# Patient Record
Sex: FEMALE | Race: WHITE | Employment: FULL TIME | URBAN - METROPOLITAN AREA
[De-identification: names, ages, dates, MRNs, and addresses within clinical notes are randomized per-mention and may not be internally consistent; named-entity substitution may affect disease eponyms.]

---

## 2017-06-26 ENCOUNTER — OFFICE VISIT (OUTPATIENT)
Dept: FAMILY MEDICINE CLINIC | Age: 27
End: 2017-06-26

## 2017-06-26 VITALS
BODY MASS INDEX: 21.19 KG/M2 | SYSTOLIC BLOOD PRESSURE: 112 MMHG | OXYGEN SATURATION: 98 % | RESPIRATION RATE: 14 BRPM | TEMPERATURE: 98.8 F | WEIGHT: 119.6 LBS | DIASTOLIC BLOOD PRESSURE: 86 MMHG | HEART RATE: 108 BPM | HEIGHT: 63 IN

## 2017-06-26 DIAGNOSIS — J30.2 SEASONAL ALLERGIC RHINITIS, UNSPECIFIED ALLERGIC RHINITIS TRIGGER: ICD-10-CM

## 2017-06-26 DIAGNOSIS — Z51.81 MEDICATION MONITORING ENCOUNTER: ICD-10-CM

## 2017-06-26 DIAGNOSIS — R73.01 IFG (IMPAIRED FASTING GLUCOSE): ICD-10-CM

## 2017-06-26 DIAGNOSIS — F41.1 GAD (GENERALIZED ANXIETY DISORDER): ICD-10-CM

## 2017-06-26 DIAGNOSIS — F33.42 RECURRENT MAJOR DEPRESSIVE DISORDER, IN FULL REMISSION (HCC): ICD-10-CM

## 2017-06-26 DIAGNOSIS — E04.1 THYROID NODULE: ICD-10-CM

## 2017-06-26 DIAGNOSIS — F41.0 PANIC ATTACK: Primary | ICD-10-CM

## 2017-06-26 DIAGNOSIS — G47.26 SHIFT WORK SLEEP DISORDER: ICD-10-CM

## 2017-06-26 PROCEDURE — 99203 OFFICE O/P NEW LOW 30 MIN: CPT | Performed by: NURSE PRACTITIONER

## 2017-06-26 RX ORDER — FLUTICASONE PROPIONATE 50 MCG
2 SPRAY, SUSPENSION (ML) NASAL DAILY PRN
Qty: 1 BOTTLE | Refills: 11 | Status: SHIPPED | OUTPATIENT
Start: 2017-06-26 | End: 2017-08-11 | Stop reason: SDUPTHER

## 2017-06-26 RX ORDER — OLOPATADINE HYDROCHLORIDE 1 MG/ML
1 SOLUTION/ DROPS OPHTHALMIC 2 TIMES DAILY PRN
Qty: 5 ML | Refills: 11 | Status: SHIPPED | OUTPATIENT
Start: 2017-06-26 | End: 2017-12-19 | Stop reason: SDUPTHER

## 2017-06-26 RX ORDER — CLONAZEPAM 1 MG/1
1.5 TABLET ORAL 2 TIMES DAILY
COMMUNITY
End: 2017-06-26 | Stop reason: SDUPTHER

## 2017-06-26 RX ORDER — ZOLPIDEM TARTRATE 10 MG/1
10 TABLET ORAL NIGHTLY PRN
Qty: 30 TABLET | Refills: 1 | Status: SHIPPED | OUTPATIENT
Start: 2017-06-26 | End: 2017-10-27 | Stop reason: SDUPTHER

## 2017-06-26 RX ORDER — MONTELUKAST SODIUM 10 MG/1
10 TABLET ORAL NIGHTLY
Qty: 30 TABLET | Refills: 11 | Status: SHIPPED | OUTPATIENT
Start: 2017-06-26 | End: 2017-12-19 | Stop reason: SDUPTHER

## 2017-06-26 RX ORDER — CLONAZEPAM 1 MG/1
1 TABLET ORAL 2 TIMES DAILY
Qty: 60 TABLET | Status: CANCELLED | OUTPATIENT
Start: 2017-06-26

## 2017-06-26 RX ORDER — DESVENLAFAXINE 50 MG/1
50 TABLET, EXTENDED RELEASE ORAL DAILY
Qty: 30 TABLET | Refills: 5 | Status: SHIPPED | OUTPATIENT
Start: 2017-06-26 | End: 2017-11-22 | Stop reason: SDUPTHER

## 2017-06-26 RX ORDER — LORAZEPAM 1 MG/1
1 TABLET ORAL EVERY 8 HOURS PRN
Qty: 30 TABLET | Refills: 0 | Status: SHIPPED | OUTPATIENT
Start: 2017-06-26 | End: 2017-10-12 | Stop reason: SDUPTHER

## 2017-06-26 RX ORDER — CLONAZEPAM 1 MG/1
1.5 TABLET ORAL 2 TIMES DAILY
Qty: 90 TABLET | Refills: 1 | Status: SHIPPED | OUTPATIENT
Start: 2017-06-26 | End: 2017-11-22 | Stop reason: ALTCHOICE

## 2017-06-26 ASSESSMENT — PATIENT HEALTH QUESTIONNAIRE - PHQ9
SUM OF ALL RESPONSES TO PHQ QUESTIONS 1-9: 0
1. LITTLE INTEREST OR PLEASURE IN DOING THINGS: 0
2. FEELING DOWN, DEPRESSED OR HOPELESS: 0
SUM OF ALL RESPONSES TO PHQ9 QUESTIONS 1 & 2: 0

## 2017-06-26 ASSESSMENT — ENCOUNTER SYMPTOMS
COUGH: 0
SHORTNESS OF BREATH: 0
ABDOMINAL PAIN: 0
NAUSEA: 0

## 2017-08-11 DIAGNOSIS — J30.2 SEASONAL ALLERGIC RHINITIS, UNSPECIFIED ALLERGIC RHINITIS TRIGGER: ICD-10-CM

## 2017-08-14 RX ORDER — FLUTICASONE PROPIONATE 50 MCG
SPRAY, SUSPENSION (ML) NASAL
Qty: 16 G | Refills: 11 | Status: SHIPPED | OUTPATIENT
Start: 2017-08-14 | End: 2019-01-04 | Stop reason: SDUPTHER

## 2017-10-09 DIAGNOSIS — F41.0 PANIC ATTACK: ICD-10-CM

## 2017-10-09 RX ORDER — LORAZEPAM 1 MG/1
1 TABLET ORAL EVERY 8 HOURS PRN
Qty: 30 TABLET | Refills: 0 | OUTPATIENT
Start: 2017-10-09

## 2017-10-09 NOTE — TELEPHONE ENCOUNTER
Cedric Roe called requesting a refill on the following medications:  Requested Prescriptions     Pending Prescriptions Disp Refills    LORazepam (ATIVAN) 1 MG tablet 30 tablet 0     Sig: Take 1 tablet by mouth every 8 hours as needed for Anxiety     Pharmacy verified:  [email or web address]      Date of last visit: 6/26/17  Date of next visit (if applicable): 59/78/0166        Date of last fill and quantity (to be completed by clinical staff)  Pharmacy name: [facility]

## 2017-10-10 ENCOUNTER — TELEPHONE (OUTPATIENT)
Dept: FAMILY MEDICINE CLINIC | Age: 27
End: 2017-10-10

## 2017-10-10 NOTE — TELEPHONE ENCOUNTER
Pt was notified and is not happy with this decision. Pt says she is uncomfortable seeing a Psychiatrist for multiple reasons and would like to discuss this with you in more detail.  Appt scheduled Thursday per pt request.

## 2017-10-10 NOTE — TELEPHONE ENCOUNTER
called pt to ask if she has appt with psychiatrist  Pt informed that she see a psychologist and her family dr gave her the Ativan  He is no longer in practice   She really doesn't want to see a psychiatrist

## 2017-10-12 ENCOUNTER — OFFICE VISIT (OUTPATIENT)
Dept: FAMILY MEDICINE CLINIC | Age: 27
End: 2017-10-12
Payer: COMMERCIAL

## 2017-10-12 VITALS
BODY MASS INDEX: 21.77 KG/M2 | RESPIRATION RATE: 20 BRPM | WEIGHT: 127.5 LBS | SYSTOLIC BLOOD PRESSURE: 124 MMHG | HEIGHT: 64 IN | DIASTOLIC BLOOD PRESSURE: 84 MMHG | HEART RATE: 104 BPM

## 2017-10-12 DIAGNOSIS — F41.1 GAD (GENERALIZED ANXIETY DISORDER): Primary | ICD-10-CM

## 2017-10-12 DIAGNOSIS — F41.0 PANIC ATTACK: ICD-10-CM

## 2017-10-12 DIAGNOSIS — F33.42 RECURRENT MAJOR DEPRESSIVE DISORDER, IN FULL REMISSION (HCC): ICD-10-CM

## 2017-10-12 PROCEDURE — 99213 OFFICE O/P EST LOW 20 MIN: CPT | Performed by: NURSE PRACTITIONER

## 2017-10-12 RX ORDER — BUSPIRONE HYDROCHLORIDE 10 MG/1
10 TABLET ORAL 2 TIMES DAILY
Qty: 60 TABLET | Refills: 2 | Status: SHIPPED | OUTPATIENT
Start: 2017-10-12 | End: 2018-05-22 | Stop reason: ALTCHOICE

## 2017-10-12 RX ORDER — LORAZEPAM 1 MG/1
1 TABLET ORAL EVERY 8 HOURS PRN
Qty: 30 TABLET | Refills: 0 | Status: SHIPPED | OUTPATIENT
Start: 2017-10-12 | End: 2017-11-22 | Stop reason: SDUPTHER

## 2017-10-12 ASSESSMENT — ENCOUNTER SYMPTOMS
NAUSEA: 0
SHORTNESS OF BREATH: 0
COUGH: 0
ABDOMINAL PAIN: 0

## 2017-10-12 NOTE — PROGRESS NOTES
Visit Information    Have you changed or started any medications since your last visit including any over-the-counter medicines, vitamins, or herbal medicines? no   Are you having any side effects from any of your medications? -  no  Have you stopped taking any of your medications? Is so, why? -  no    Have you seen any other physician or provider since your last visit? No  Have you had any other diagnostic tests since your last visit? No  Have you been seen in the emergency room and/or had an admission to a hospital since we last saw you? No  Have you had your routine dental cleaning in the past 6 months? no    Have you activated your [employer] account? If not, what are your barriers?  Yes     Patient Care Team:  Cony Mejia NP as PCP - General (Certified Nurse Practitioner)    Medical History Review  Past Medical, Family, and Social History reviewed and does not contribute to the patient presenting condition    Health Maintenance   Topic Date Due    HIV screen  04/12/2003    DTaP/Tdap/Td vaccine (1 - Tdap) 04/12/2007    Cervical cancer screen  04/12/2009    Flu vaccine (1) 09/01/2017
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
## 2017-10-27 DIAGNOSIS — G47.26 SHIFT WORK SLEEP DISORDER: ICD-10-CM

## 2017-10-27 RX ORDER — ZOLPIDEM TARTRATE 10 MG/1
10 TABLET ORAL NIGHTLY PRN
Qty: 30 TABLET | Refills: 1 | Status: SHIPPED | OUTPATIENT
Start: 2017-10-27 | End: 2017-11-22 | Stop reason: SDUPTHER

## 2017-10-27 NOTE — TELEPHONE ENCOUNTER
Lai Kincaid called requesting a refill on the following medications:  Requested Prescriptions     Pending Prescriptions Disp Refills    zolpidem (AMBIEN) 10 MG tablet 30 tablet 1     Sig: Take 1 tablet by mouth nightly as needed for Sleep     Pharmacy verified:  [email or web address]      Date of last visit: 10/12/17  Date of next visit (if applicable): 31/52/9737        Date of last fill and quantity (to be completed by clinical staff)  Pharmacy name: [address] [place] [address]

## 2017-11-14 ENCOUNTER — HOSPITAL ENCOUNTER (INPATIENT)
Age: 27
LOS: 2 days | Discharge: HOME | DRG: 101 | End: 2017-11-17
Attending: EMERGENCY MEDICINE | Admitting: ANESTHESIOLOGY
Payer: COMMERCIAL

## 2017-11-14 DIAGNOSIS — R56.9 SEIZURE (HCC): Primary | ICD-10-CM

## 2017-11-14 PROCEDURE — 99285 EMERGENCY DEPT VISIT HI MDM: CPT

## 2017-11-14 ASSESSMENT — PAIN DESCRIPTION - LOCATION: LOCATION: HEAD;NECK

## 2017-11-14 ASSESSMENT — PAIN DESCRIPTION - PAIN TYPE: TYPE: ACUTE PAIN

## 2017-11-14 ASSESSMENT — PAIN SCALES - GENERAL: PAINLEVEL_OUTOF10: 5

## 2017-11-15 ENCOUNTER — APPOINTMENT (OUTPATIENT)
Dept: MRI IMAGING | Age: 27
DRG: 101 | End: 2017-11-15
Payer: COMMERCIAL

## 2017-11-15 PROBLEM — R56.9 SEIZURES (HCC): Status: ACTIVE | Noted: 2017-11-15

## 2017-11-15 LAB
ANION GAP SERPL CALCULATED.3IONS-SCNC: 15 MEQ/L (ref 8–16)
BASOPHILS # BLD: 0.3 %
BASOPHILS ABSOLUTE: 0 THOU/MM3 (ref 0–0.1)
BUN BLDV-MCNC: 6 MG/DL (ref 7–22)
CALCIUM SERPL-MCNC: 9.4 MG/DL (ref 8.5–10.5)
CHLORIDE BLD-SCNC: 102 MEQ/L (ref 98–111)
CO2: 24 MEQ/L (ref 23–33)
CREAT SERPL-MCNC: 0.6 MG/DL (ref 0.4–1.2)
EKG ATRIAL RATE: 116 BPM
EKG P AXIS: 68 DEGREES
EKG P-R INTERVAL: 152 MS
EKG Q-T INTERVAL: 334 MS
EKG QRS DURATION: 82 MS
EKG QTC CALCULATION (BAZETT): 464 MS
EKG R AXIS: 61 DEGREES
EKG T AXIS: 38 DEGREES
EKG VENTRICULAR RATE: 116 BPM
EOSINOPHIL # BLD: 0.8 %
EOSINOPHILS ABSOLUTE: 0 THOU/MM3 (ref 0–0.4)
FOLATE: > 20 NG/ML (ref 4.8–24.2)
GFR SERPL CREATININE-BSD FRML MDRD: > 90 ML/MIN/1.73M2
GLUCOSE BLD-MCNC: 129 MG/DL (ref 70–108)
HCT VFR BLD CALC: 40.8 % (ref 37–47)
HEMOGLOBIN: 13.7 GM/DL (ref 12–16)
LV EF: 60 %
LVEF MODALITY: NORMAL
LYMPHOCYTES # BLD: 19 %
LYMPHOCYTES ABSOLUTE: 1.1 THOU/MM3 (ref 1–4.8)
MCH RBC QN AUTO: 28.6 PG (ref 27–31)
MCHC RBC AUTO-ENTMCNC: 33.5 GM/DL (ref 33–37)
MCV RBC AUTO: 85.4 FL (ref 81–99)
MONOCYTES # BLD: 5.1 %
MONOCYTES ABSOLUTE: 0.3 THOU/MM3 (ref 0.4–1.3)
NUCLEATED RED BLOOD CELLS: 0 /100 WBC
OSMOLALITY CALCULATION: 280.6 MOSMOL/KG (ref 275–300)
PDW BLD-RTO: 13.3 % (ref 11.5–14.5)
PLATELET # BLD: 295 THOU/MM3 (ref 130–400)
PMV BLD AUTO: 8.1 MCM (ref 7.4–10.4)
POTASSIUM SERPL-SCNC: 3.7 MEQ/L (ref 3.5–5.2)
PREGNANCY, SERUM: NEGATIVE
RBC # BLD: 4.78 MILL/MM3 (ref 4.2–5.4)
SEG NEUTROPHILS: 74.8 %
SEGMENTED NEUTROPHILS ABSOLUTE COUNT: 4.2 THOU/MM3 (ref 1.8–7.7)
SODIUM BLD-SCNC: 141 MEQ/L (ref 135–145)
VITAMIN B-12: 512 PG/ML (ref 211–911)
WBC # BLD: 5.6 THOU/MM3 (ref 4.8–10.8)

## 2017-11-15 PROCEDURE — 84703 CHORIONIC GONADOTROPIN ASSAY: CPT

## 2017-11-15 PROCEDURE — 95819 EEG AWAKE AND ASLEEP: CPT

## 2017-11-15 PROCEDURE — A9579 GAD-BASE MR CONTRAST NOS,1ML: HCPCS | Performed by: PSYCHIATRY & NEUROLOGY

## 2017-11-15 PROCEDURE — 1210000002 HC MED SURG R&B - NEUROSCIENCE

## 2017-11-15 PROCEDURE — 6360000004 HC RX CONTRAST MEDICATION: Performed by: PSYCHIATRY & NEUROLOGY

## 2017-11-15 PROCEDURE — 6370000000 HC RX 637 (ALT 250 FOR IP): Performed by: PSYCHIATRY & NEUROLOGY

## 2017-11-15 PROCEDURE — 82746 ASSAY OF FOLIC ACID SERUM: CPT

## 2017-11-15 PROCEDURE — 80048 BASIC METABOLIC PNL TOTAL CA: CPT

## 2017-11-15 PROCEDURE — 70553 MRI BRAIN STEM W/O & W/DYE: CPT

## 2017-11-15 PROCEDURE — 6360000002 HC RX W HCPCS: Performed by: ANESTHESIOLOGY

## 2017-11-15 PROCEDURE — 2580000003 HC RX 258: Performed by: ANESTHESIOLOGY

## 2017-11-15 PROCEDURE — 99223 1ST HOSP IP/OBS HIGH 75: CPT | Performed by: PSYCHIATRY & NEUROLOGY

## 2017-11-15 PROCEDURE — 36415 COLL VENOUS BLD VENIPUNCTURE: CPT

## 2017-11-15 PROCEDURE — 82607 VITAMIN B-12: CPT

## 2017-11-15 PROCEDURE — 99222 1ST HOSP IP/OBS MODERATE 55: CPT | Performed by: ANESTHESIOLOGY

## 2017-11-15 PROCEDURE — 93306 TTE W/DOPPLER COMPLETE: CPT

## 2017-11-15 PROCEDURE — 6370000000 HC RX 637 (ALT 250 FOR IP): Performed by: INTERNAL MEDICINE

## 2017-11-15 PROCEDURE — 6370000000 HC RX 637 (ALT 250 FOR IP): Performed by: ANESTHESIOLOGY

## 2017-11-15 PROCEDURE — 84207 ASSAY OF VITAMIN B-6: CPT

## 2017-11-15 PROCEDURE — 85025 COMPLETE CBC W/AUTO DIFF WBC: CPT

## 2017-11-15 PROCEDURE — 6370000000 HC RX 637 (ALT 250 FOR IP): Performed by: EMERGENCY MEDICINE

## 2017-11-15 PROCEDURE — 93005 ELECTROCARDIOGRAM TRACING: CPT

## 2017-11-15 RX ORDER — HYDROCODONE BITARTRATE AND ACETAMINOPHEN 5; 325 MG/1; MG/1
1 TABLET ORAL EVERY 4 HOURS PRN
Status: DISCONTINUED | OUTPATIENT
Start: 2017-11-15 | End: 2017-11-17 | Stop reason: HOSPADM

## 2017-11-15 RX ORDER — MONTELUKAST SODIUM 10 MG/1
10 TABLET ORAL NIGHTLY
Status: DISCONTINUED | OUTPATIENT
Start: 2017-11-15 | End: 2017-11-17 | Stop reason: HOSPADM

## 2017-11-15 RX ORDER — POTASSIUM CHLORIDE 20MEQ/15ML
40 LIQUID (ML) ORAL PRN
Status: DISCONTINUED | OUTPATIENT
Start: 2017-11-15 | End: 2017-11-17 | Stop reason: HOSPADM

## 2017-11-15 RX ORDER — POTASSIUM CHLORIDE 20 MEQ/1
40 TABLET, EXTENDED RELEASE ORAL PRN
Status: DISCONTINUED | OUTPATIENT
Start: 2017-11-15 | End: 2017-11-17 | Stop reason: HOSPADM

## 2017-11-15 RX ORDER — POTASSIUM CHLORIDE 7.45 MG/ML
10 INJECTION INTRAVENOUS PRN
Status: DISCONTINUED | OUTPATIENT
Start: 2017-11-15 | End: 2017-11-17 | Stop reason: HOSPADM

## 2017-11-15 RX ORDER — FLUTICASONE PROPIONATE 50 MCG
2 SPRAY, SUSPENSION (ML) NASAL DAILY
Status: DISCONTINUED | OUTPATIENT
Start: 2017-11-15 | End: 2017-11-15 | Stop reason: CLARIF

## 2017-11-15 RX ORDER — ACETAMINOPHEN 325 MG/1
650 TABLET ORAL EVERY 4 HOURS PRN
Status: DISCONTINUED | OUTPATIENT
Start: 2017-11-15 | End: 2017-11-17 | Stop reason: HOSPADM

## 2017-11-15 RX ORDER — SODIUM CHLORIDE 0.9 % (FLUSH) 0.9 %
10 SYRINGE (ML) INJECTION EVERY 12 HOURS SCHEDULED
Status: DISCONTINUED | OUTPATIENT
Start: 2017-11-15 | End: 2017-11-17 | Stop reason: HOSPADM

## 2017-11-15 RX ORDER — FAMOTIDINE 20 MG/1
20 TABLET, FILM COATED ORAL 2 TIMES DAILY
Status: DISCONTINUED | OUTPATIENT
Start: 2017-11-15 | End: 2017-11-17 | Stop reason: HOSPADM

## 2017-11-15 RX ORDER — LAMOTRIGINE 100 MG/1
100 TABLET ORAL DAILY
Status: DISCONTINUED | OUTPATIENT
Start: 2017-12-13 | End: 2017-11-17 | Stop reason: HOSPADM

## 2017-11-15 RX ORDER — DESVENLAFAXINE 50 MG/1
50 TABLET, EXTENDED RELEASE ORAL DAILY
Status: DISCONTINUED | OUTPATIENT
Start: 2017-11-15 | End: 2017-11-17 | Stop reason: HOSPADM

## 2017-11-15 RX ORDER — SODIUM CHLORIDE 9 MG/ML
INJECTION, SOLUTION INTRAVENOUS CONTINUOUS
Status: DISCONTINUED | OUTPATIENT
Start: 2017-11-15 | End: 2017-11-17 | Stop reason: HOSPADM

## 2017-11-15 RX ORDER — SODIUM CHLORIDE 0.9 % (FLUSH) 0.9 %
10 SYRINGE (ML) INJECTION PRN
Status: DISCONTINUED | OUTPATIENT
Start: 2017-11-15 | End: 2017-11-17 | Stop reason: HOSPADM

## 2017-11-15 RX ORDER — LAMOTRIGINE 25 MG/1
50 TABLET ORAL DAILY
Status: DISCONTINUED | OUTPATIENT
Start: 2017-11-29 | End: 2017-11-17 | Stop reason: HOSPADM

## 2017-11-15 RX ORDER — ALPRAZOLAM 0.25 MG/1
0.25 TABLET ORAL 2 TIMES DAILY PRN
Status: DISCONTINUED | OUTPATIENT
Start: 2017-11-15 | End: 2017-11-17 | Stop reason: HOSPADM

## 2017-11-15 RX ORDER — BUSPIRONE HYDROCHLORIDE 10 MG/1
10 TABLET ORAL 2 TIMES DAILY
Status: DISCONTINUED | OUTPATIENT
Start: 2017-11-15 | End: 2017-11-17 | Stop reason: HOSPADM

## 2017-11-15 RX ORDER — ZOLPIDEM TARTRATE 10 MG/1
10 TABLET ORAL NIGHTLY PRN
Status: DISCONTINUED | OUTPATIENT
Start: 2017-11-15 | End: 2017-11-17 | Stop reason: HOSPADM

## 2017-11-15 RX ORDER — HYDROCODONE BITARTRATE AND ACETAMINOPHEN 5; 325 MG/1; MG/1
2 TABLET ORAL EVERY 4 HOURS PRN
Status: DISCONTINUED | OUTPATIENT
Start: 2017-11-15 | End: 2017-11-17 | Stop reason: HOSPADM

## 2017-11-15 RX ORDER — HYDROCODONE BITARTRATE AND ACETAMINOPHEN 5; 325 MG/1; MG/1
1 TABLET ORAL ONCE
Status: COMPLETED | OUTPATIENT
Start: 2017-11-15 | End: 2017-11-15

## 2017-11-15 RX ORDER — LAMOTRIGINE 25 MG/1
25 TABLET ORAL DAILY
Status: DISCONTINUED | OUTPATIENT
Start: 2017-11-15 | End: 2017-11-17 | Stop reason: HOSPADM

## 2017-11-15 RX ORDER — OLOPATADINE HYDROCHLORIDE 1 MG/ML
1 SOLUTION/ DROPS OPHTHALMIC 2 TIMES DAILY PRN
Status: DISCONTINUED | OUTPATIENT
Start: 2017-11-15 | End: 2017-11-17 | Stop reason: HOSPADM

## 2017-11-15 RX ORDER — ONDANSETRON 2 MG/ML
4 INJECTION INTRAMUSCULAR; INTRAVENOUS EVERY 6 HOURS PRN
Status: DISCONTINUED | OUTPATIENT
Start: 2017-11-15 | End: 2017-11-17 | Stop reason: HOSPADM

## 2017-11-15 RX ORDER — LAMOTRIGINE 25 MG/1
25 TABLET ORAL DAILY
Status: DISCONTINUED | OUTPATIENT
Start: 2017-11-15 | End: 2017-11-15

## 2017-11-15 RX ORDER — LORAZEPAM 1 MG/1
1 TABLET ORAL EVERY 8 HOURS PRN
Status: DISCONTINUED | OUTPATIENT
Start: 2017-11-15 | End: 2017-11-17 | Stop reason: HOSPADM

## 2017-11-15 RX ADMIN — BUSPIRONE HYDROCHLORIDE 10 MG: 10 TABLET ORAL at 20:41

## 2017-11-15 RX ADMIN — Medication 10 ML: at 20:41

## 2017-11-15 RX ADMIN — ONDANSETRON 4 MG: 2 INJECTION INTRAMUSCULAR; INTRAVENOUS at 09:03

## 2017-11-15 RX ADMIN — FAMOTIDINE 20 MG: 20 TABLET, FILM COATED ORAL at 08:59

## 2017-11-15 RX ADMIN — CLONAZEPAM 1.5 MG: 1 TABLET ORAL at 20:41

## 2017-11-15 RX ADMIN — GADOTERIDOL 10 ML: 279.3 INJECTION, SOLUTION INTRAVENOUS at 13:57

## 2017-11-15 RX ADMIN — ALPRAZOLAM 0.25 MG: 0.25 TABLET ORAL at 14:15

## 2017-11-15 RX ADMIN — CLONAZEPAM 1.5 MG: 1 TABLET ORAL at 08:59

## 2017-11-15 RX ADMIN — HYDROCODONE BITARTRATE AND ACETAMINOPHEN 2 TABLET: 5; 325 TABLET ORAL at 08:51

## 2017-11-15 RX ADMIN — BUSPIRONE HYDROCHLORIDE 10 MG: 10 TABLET ORAL at 08:59

## 2017-11-15 RX ADMIN — HYDROCODONE BITARTRATE AND ACETAMINOPHEN 1 TABLET: 5; 325 TABLET ORAL at 15:49

## 2017-11-15 RX ADMIN — HYDROCODONE BITARTRATE AND ACETAMINOPHEN 1 TABLET: 5; 325 TABLET ORAL at 00:25

## 2017-11-15 RX ADMIN — FAMOTIDINE 20 MG: 20 TABLET, FILM COATED ORAL at 20:41

## 2017-11-15 RX ADMIN — DESVENLAFAXINE SUCCINATE 50 MG: 50 TABLET, FILM COATED, EXTENDED RELEASE ORAL at 08:56

## 2017-11-15 RX ADMIN — MONTELUKAST SODIUM 10 MG: 10 TABLET, FILM COATED ORAL at 20:41

## 2017-11-15 RX ADMIN — LAMOTRIGINE 25 MG: 25 TABLET ORAL at 20:41

## 2017-11-15 RX ADMIN — SODIUM CHLORIDE: 9 INJECTION, SOLUTION INTRAVENOUS at 08:54

## 2017-11-15 ASSESSMENT — PAIN SCALES - GENERAL
PAINLEVEL_OUTOF10: 5
PAINLEVEL_OUTOF10: 8
PAINLEVEL_OUTOF10: 2
PAINLEVEL_OUTOF10: 5
PAINLEVEL_OUTOF10: 0
PAINLEVEL_OUTOF10: 5

## 2017-11-15 ASSESSMENT — ENCOUNTER SYMPTOMS
COUGH: 0
EYE PAIN: 0
BACK PAIN: 0
SORE THROAT: 0
NAUSEA: 0
DIARRHEA: 0
VOMITING: 0
EYE DISCHARGE: 0
ABDOMINAL PAIN: 0
RHINORRHEA: 0
WHEEZING: 0
SHORTNESS OF BREATH: 0

## 2017-11-15 ASSESSMENT — PAIN DESCRIPTION - LOCATION
LOCATION: HEAD;NECK
LOCATION: HEAD;NECK

## 2017-11-15 ASSESSMENT — PAIN DESCRIPTION - DESCRIPTORS
DESCRIPTORS: HEADACHE;DISCOMFORT
DESCRIPTORS: DISCOMFORT;HEADACHE;SHARP

## 2017-11-15 ASSESSMENT — PAIN DESCRIPTION - PAIN TYPE
TYPE: ACUTE PAIN
TYPE: ACUTE PAIN

## 2017-11-15 ASSESSMENT — PAIN DESCRIPTION - ORIENTATION: ORIENTATION: POSTERIOR;ANTERIOR

## 2017-11-15 NOTE — CONSULTS
states she is not on blood thinners. No relation of symptoms to her menstrual cycle. She has never passed out as a child. No family history of seizure. She denies chest pain. No shortness of breath with exertion. Reports no neck pain. No vision changes. No dysphagia. No fever. No rash. No weight loss. History provided by patient accompanied by her father and 2 other family members. Past Medical History:        Diagnosis Date    Allergy     Anxiety 2007    Depression     Mesenteric adenitis     Ovarian cyst     Psychiatric problem     Seizures ([address]            Procedure Laterality Date    ADENOIDECTOMY      BREAST BIOPSY      fibroadenoma    BREAST LUMPECTOMY  2009    LEE      MOUTH SURGERY  2009    TONSILLECTOMY      WISDOM TOOTH EXTRACTION  2009       Allergies:     Allergies   Allergen Reactions    Amoxicillin Hives    Avelox [Moxifloxacin]     Cleocin [Clindamycin Hcl] Other (See Comments)     Caused C-diff    Levaquin [Levofloxacin]     Flexeril [Cyclobenzaprine] Palpitations    Nsaids Nausea And Vomiting    Tramadol Nausea And Vomiting        Current Medications:     busPIRone (BUSPAR) tablet 10 mg BID   clonazePAM (KLONOPIN) tablet 1.5 mg BID   desvenlafaxine succinate (PRISTIQ) extended release tablet 50 mg Daily   LORazepam (ATIVAN) tablet 1 mg Q8H PRN   montelukast (SINGULAIR) tablet 10 mg Nightly   olopatadine (PATANOL) 0.1 % ophthalmic solution 1 drop BID PRN   zolpidem (AMBIEN) tablet 10 mg Nightly PRN   sodium chloride flush 0.9 % injection 10 mL 2 times per day   sodium chloride flush 0.9 % injection 10 mL PRN   acetaminophen (TYLENOL) tablet 650 mg Q4H PRN   magnesium hydroxide (MILK OF MAGNESIA) 400 MG/5ML suspension 30 mL Daily PRN   ondansetron (ZOFRAN) injection 4 mg Q6H PRN   enoxaparin (LOVENOX) injection 40 mg Daily   0.9 % sodium chloride infusion Continuous   potassium chloride (KLOR-CON M) extended release tablet 40 mEq PRN   Or    potassium chloride 20 MEQ/15ML is no thyroid enlargement. Neurological -   Cranial [id number]:   Cranial nerve II: Normal. There is full visual fields  Cranial nerve III: Pupils: equal, round, reactive to light   Cranial nerves III, IV, VI: Extraocular Movements: intact   Cranial nerve V: Facial sensation: intact   Cranial nerve VII:Facial strength: intact   Cranial nerve VIII: Hearing: intact   Cranial nerve IX: Palate Elevation intact bilaterally  Cranial nerve XI: Shoulder shrug intact bilaterally  Cranial nerve XII: Tongue midline   neck supple without rigidity  DTR's are intact distal and symmetric  Babinski sign is negative on bilaterally. Motor exam is 5/5 in the upper and lower extremities. Normal muscle tone . There is no muscle atrophy. There is no muscle fasciculation . Drift No, Orbiting no. Sensory is intact forlight touch and cortical sensation . Coordination: finger to nose intact  Gait and station intact. Abnormal movement none. vibration normal, proprioception normal   Skin - no rashes or lesions   Superficial temporal artery pulses are normal.   Musculoskeletal: Has no hand arthritis, no limitation of ROM in any of the four extremities. no joint tenderness, deformity or swelling. There is no leg edema. The Heart was regular in rate and rhythm. No heart murmur  Chest- Clear  Abdomen: Soft, Intact bowel sounds. Labs:    CBC: Recent Labs      11/15/17   0024   WBC  5.6   HGB  13.7   PLT  295   MCV  85.4   MCH  28.6   MCHC  33.5   RDW  13.3     CMP:  Recent Labs      11/15/17   0024   NA  141   K  3.7   CL  102   CO2  24   BUN  6*   CREATININE  0.6   LABGLOM  >90   GLUCOSE  129*   CALCIUM  9.4     MRI BRAIN:  Pending. EEG :   Bifrontal epileptiform discharges in the form of spike and slow wave, sharp wave activity observed periodically during the recording.       We reviewed the patient records and available information in the EHR       Impression:    Principal Problem:    New onset seizure ([facility]  Active Problems:    ZAIDA (generalized anxiety disorder)    Recurrent major depressive disorder, in full remission ([facility]    Seasonal allergic rhinitis    This is a 29-year-old female who presents with new onset seizure, generalized. The patient has history of anxiety. She has been on clonazepam, BuSpar and Xanax. The patient experienced 2 seizures in 5/2017, that were attributed to medication of the time with no EEG as part of work up performed. She underwent an EEG this a.m. that showed bifrontal epileptiform discharges in the form of spike and wave, and sharp wave activity. She probably has underlying seizure disorder. She would benefit from workup including MRI brain, tilt table study. I agree with maintaining her on the [address], currently as has been started. There is no injury noted on exam.  The patient would also benefit from B12, folate, B6, and consider obtaining a Holter monitor on outpatient basis after discharge. Consider other causes of her seizure beyond the EEG findings, as she reports having a low blood pressure, at baseline that needs to be taken in consideration and making management decisions. She is also on clonazepam and BuSpar, Xanax withdrawal of these medications can provoke a seizure. The patient and her family were counseled at length about the workup planned, the condition differential diagnosis suspected. They asked questions reflecting understanding. The patient also expressed wishes not to take antiepileptic medication unless she absolutely needs it. After detailed discussion with patient and her family we agreed on the following plan. Recommendations:                                              1. EEG  2. MRI brain with and without contrast.   3. Seizure precautions. 4. B12, Folate, B6.   5. Consider placing on antiepileptic medications as next step. 6. Holter monitor at discharge.    7. No driving, swimming, operating heavy machinery or compromising heights until event

## 2017-11-15 NOTE — FLOWSHEET NOTE
Pt was anointed     11/15/17 1704   Encounter Summary   Services provided to: Patient and family together   Referral/Consult From: [facility] Family members   Place of [facility] Visiting No  (11/15 )   Complexity of Encounter Low   Length of Encounter 15 minutes   Routine   Type Initial   Assessment Approachable;Calm   Intervention Shannon;Prayer;Nurtured hope   Outcome Acceptance;Expressed gratitude;Encouraged; Hopeful;Receptive   Sacraments   Sacrament of Sick-Anointing Anointed

## 2017-11-15 NOTE — ED NOTES
Patient arrived to ED with father with c/o possible seizure. Father states that he went to check on the patient because he heard a \"thud\" and he found her on the floor, shaking and not responding to his voice. Patient's father is a medic and Lieutenant and has witnessed many seizures. Patient has a history of seizures in which she was currently taking Klonopin and was recently switched to Buspar after last seizure. Patient states that her neck is slightly sore. Father states that she was postictal for roughly 15 minutes after seizure activity. Monitor applied/vitals taken/assessment performed. Will continue to monitor.        Princess Miller RN  11/15/17 9595

## 2017-11-15 NOTE — CARE COORDINATION
11/15/17, 10:15 AM      Shiva Mart       Admitted from: ER, patient presented due to reported seizure activity at home as witnessed by her father. 11/14/2017/ [address]le [facility] day: 0   Location: 4A-03/003-A Reason for admit: Seizures ([facility] [R56.9] Status: Inpt. Admit order signed?: yes  PMH:  has a past medical history of Allergy; Anxiety; Depression; Mesenteric adenitis; Ovarian cyst; Psychiatric problem; and Seizures ([facility] Procedure: EEG  Pertinent abnormal Imaging:No scans to report. Medications:  Scheduled Meds:   busPIRone  10 mg Oral BID    clonazePAM  1.5 mg Oral BID    desvenlafaxine succinate  50 mg Oral Daily    montelukast  10 mg Oral Nightly    sodium chloride flush  10 mL Intravenous 2 times per day    enoxaparin  40 mg Subcutaneous Daily    famotidine  20 mg Oral BID    levetiracetam  500 mg Intravenous Q12H    phosphorus replacement protocol   Other RX Placeholder     Continuous Infusions:   sodium chloride 75 mL/hr at 11/15/17 0854      Pertinent Info/Orders/Treatment Plan: Neurology consult to Dr. Feliberto Huertas, EEG, IV Keppra q 12 hr., IV fluids, prn Ativan,  Potassium and Phosphorus replacement protocols, telemetry, up as tolerated. Diet: DIET GENERAL;   DVT Prophylaxis: SCD's ordered  Smoking status:  reports that she has never smoked. She has never used smokeless tobacco.   Influenza Vaccination Screening Completed: Primary RN, Halley Rank to address. Pneumonia Vaccination Screening Completed: Primary RN Halley Rank to address.    Core measures: VTE  PCP: Kwesi Monreal NP  Readmission:   No  Risk Score:  5.25  Discharge Planning  Current Residence:  Private Residence  Living Arrangements:  Family Members   Support Systems:  Family Members, Friends/Neighbors  Current Services PTA:     Potential Assistance Needed:  N/A  Potential Assistance Purchasing Medications:  No  Does patient want to participate in local refill/ meds to beds program?  No  Type of Home Care Services:  None  Patient expects to be discharged to:  home  Expected Discharge date:  11/18/17  Follow Up Appointment: Best Day/ Time: Monday AM  Discharge Plan: Home with her parents.     Evaluation: no

## 2017-11-15 NOTE — H&P
[address] [place] [address]                               HISTORY AND PHYSICAL    PATIENT NAME: Jose Roach                     :        1990  MED REC NO:   [id number]                           ROOM:       0003  ACCOUNT NO:   [de-identified]                           ADMIT DATE: 2017  PROVIDER:     Bc Reddy M.D. AGE:  32. GENDER:  Female. ETHNICITY:  . CHIEF COMPLAINT:  Seizures. HISTORY OF PRESENT ILLNESS:  This 29-year-old female presents for  evaluation of seizures. She has had those before and first being in May. She went to the hospital, had another seizure while there. CT scan at the  hospital was benign. Does not take any seizure medication, but does take  Ativan for anxiety which is given as a seizure medication. The patient was  found by her father on the ground face-down, convulsing. Father says this  convulsing lasted about 45 seconds. The patient states she was confused  for 5 minutes after the seizure. The patient says that she has had pain  over her frontal lobe on the back of the head. The patient states she does  not usually get headaches. She also has some neck pain. The patient  complains of lightheadedness. The patient denies vision change, nausea,  vomiting, fever, chills, bleeding, dysuria, hematuria, chest pain,  shortness of breath, abdominal pain, numbness, or tingling. The patient  states she has had a history of seizures in the past, as mentioned above. She denies any smoking or drug use, but admits to drinking alcohol once  every couple of months. The patient states that she is not on blood  thinners. Last menstrual cycle was on 10/21. She is not currently on her  menstrual cycle. REVIEW OF SYSTEMS:  GENERAL:  No appetite change, no fatigue, no fever, no chills. RESPIRATORY:  No cough, no phlegm production, no hemoptysis.   No shortness  of daily.  3.  She is on desvenlafaxine succinate or Pristiq 50 mg extended release  tablet by mouth daily. 4.  She is on fluticasone or Flonase, use 2 sprays by nasal route daily as  needed for allergies. 5.  She is on lorazepam or Ativan 1 mg by mouth every 8 hours as needed for  anxiety. 6.  She is on montelukast or Singulair 10 mg by mouth nightly. 7.  She is on olopatadine or Patanol 0.1% ophthalmic solution 1 drop into  both eyes 2 times daily as needed for allergies. 8.  She is on zolpidem or Ambien 10 mg by mouth nightly as needed for  sleep. PHYSICAL EXAMINATION:  VITAL SIGNS:  Blood pressure is 119/88. Pulse is 106, sinus tachycardia,  regular rate and rhythm on the monitor. Respirations are 18. Saturating  94% on room air, up to 100% on room air earlier. Temperature 98.7 degrees  Fahrenheit, which is the highest it has been since she has been here. She  weighs 55.2 kg. She is 5 feet 3 inches tall. GENERAL:  Alert and oriented x3, young female. Well developed, well  nourished. GCS 15. HEENT:  Normocephalic and atraumatic. NECK:  Normal range of motion and supple. No meningismus or nuchal  rigidity. No vertebral tenderness. No JVD noted. Carotid pulses present. No bruits present. Trachea midline and mobile. No thyromegaly noted. CHEST:  No deformities. No trauma. Equal excursion, bilateral respiratory  effort. No use of accessory muscles of respiration. No respiratory  distress. LUNGS:  Clear to auscultation in all fields. No rales, rhonchi, wheezing,  or crackles. HEART:  PMI in normal place. Normal S1. Normal S2.  Regular rate and  rhythm. No S3. No S4. No murmurs, gallops, rubs, or clicks. ABDOMEN:  Soft. Nondistended. Nontender. No peritoneal signs. No masses  felt. Bowel sounds hypoactive in all 4 quadrants. EXTREMITIES:  No cyanosis, clubbing, or edema. Radial pulses are 4+/4+  bilaterally. Pedal pulses are 3+/4+ bilaterally. No pedal edema.   No  ankle edema. No calf tenderness. No calf cords. Negative Homans' sign. No evidence of DVT. NEUROLOGIC:  Alert and oriented x3. GCS 15. Cranial nerves II through XII  are intact. Muscle strength is 5/5 in all muscle groups in all  extremities. Sensory exam is symmetrical and within normal limits. SKIN:  Skin is warm and dry without diaphoresis and no erythema. PSYCHIATRIC:  She has normal mood and affect. Her behavior is normal.    EKG:  EKG shows sinus tachycardia at 116 beats per minute. Otherwise, the  EKG is normal with no ST- or T-wave changes indicative of any ischemia or  nonspecific changes. LABORATORY DATA:  Sodium 141. Potassium 3.7. Chloride 102. Bicarb 24. BUN 6. Creatinine 0.6 with estimated GFR greater than 90. Anion gap 15.0. Glucose 129. Calcium 9.4. Osmolality 280. 6. Pregnancy test is negative. CBC shows white count 5.6 with a normal differential.  H and H are 13.7 and  40.8, which is normal.  RBC indices are within normal limits. RDW is 13.3,  which is normal.  Platelet count is 796. ASSESSMENT AND PLAN:  1. Recurrent seizures. Consult Neurology and put her on Keppra. Kept her  Ativan around for anxiety. Wondering about whether to do an MRI on her  head or not. 2.  Anxiety. Continue her BuSpar and her Klonopin. 3.  Depression. Continue her Pristiq. 4. DVT prophylaxis with subcu Lovenox. 5.  GI prophylaxis with famotidine 20 mg twice daily. 6.  Continue her fluticasone or Flonase nasal spray for allergies. 7.  Continue her montelukast for her asthma. I spent 50 minutes evaluating and managing the patient including review of  records, documentation, and examination of the patient. This 50 minutes of  hospitalist time not including time spent performing procedures as none  were performed.         Jud De Leon M.D.    D: 11/15/2017 6:52:29       T: 11/15/2017 6:59:52     [email or web address]  Job#: [id number]     Doc#: [id number]

## 2017-11-15 NOTE — ED PROVIDER NOTES
[facility]  eMERGENCY dEPARTMENT eNCOUnter          CHIEF COMPLAINT       Chief Complaint   Patient presents with    Seizures       Nurses Notes reviewed and I agree except as noted in the HPI. HISTORY OF PRESENT ILLNESS    Lit Nagy is a 32 y.o. female who presents to the Emergency Department for the evaluation of seizures. The patient states that she was found by her father on the ground face down convulsing. The father states that the convulsing lasted about 45 seconds. The patient states she was confused for about 5 minutes after the seizure. The patient states that she has head pain over her frontal lobe and on the back of her head. The patient states she does not usually get headaches. She also has neck pain. The patient complains of lightheadedness. The patient denies vision change, nausea, vomiting, fever, chills, bleeding, dysuria, hematuria, chest pain, shortness of breath, abdominal pain, numbness, and tingling. The patient states she has a history of seizures with her first being in May. She went to the hospital and had another seizure while there. Her CAT scan while at the hospital was benign. The patient states that she does not take any seizure medication but does take Ativan for her anxiety that is given as seizure medication. The patient denies smoking or drug use but admits to drinking alcohol once every couple months. The patient states she is not on blood thinners. The patient's last menstrual cycle was the 21 of last month and she is not currently on her menstrual cycle. The HPI was provided by the patient. REVIEW OF SYSTEMS     Review of Systems   Constitutional: Negative for appetite change, chills, fatigue and fever. HENT: Negative for congestion, ear pain, rhinorrhea and sore throat. Eyes: Negative for pain, discharge and visual disturbance. Respiratory: Negative for cough, shortness of breath and wheezing.     Cardiovascular: Negative for chest strength. No cranial nerve deficit or sensory deficit. She exhibits normal muscle tone. GCS eye subscore is 4. GCS verbal subscore is 5. GCS motor subscore is 6. Skin: Skin is warm and dry. She is not diaphoretic. No erythema. Psychiatric: She has a normal mood and affect. Her behavior is normal.   Nursing note and vitals reviewed. DIFFERENTIAL DIAGNOSIS:   Electrolyte deficiency, Epilepsy, and head injury    DIAGNOSTIC RESULTS     EKG: All EKG's are interpreted by the Emergency Department Physician who either signs or Co-signs this chart in the absence of a cardiologist.  EKG interpreted by Kana Hercules, DO:    Vent. Rate: 116 bpm  IL interval: 152 ms  QRS duration: 82 ms  QTc: 464 ms  P-R-T axes: 68, 61, 38  Sinus tachycardia. No STEMI. Compared to old EKG on 24-Jun-2015      RADIOLOGY: non-plain film images(s) such as CT, Ultrasound and MRI are read by the radiologist.    No orders to display       LABS:   Labs Reviewed   CBC WITH AUTO DIFFERENTIAL - Abnormal; Notable for the following:        Result Value    Monocytes # 0.3 (*)     All other components within normal limits   BASIC METABOLIC PANEL - Abnormal; Notable for the following:     Glucose 129 (*)     BUN 6 (*)     All other components within normal limits   HCG, SERUM, QUALITATIVE   ANION GAP   GLOMERULAR FILTRATION RATE, ESTIMATED   OSMOLALITY       EMERGENCY DEPARTMENT COURSE:   Vitals:    Vitals:    11/14/17 2346 11/15/17 0044 11/15/17 0138   BP: (!) 130/102 (!) 118/96 (!) 121/94   Pulse: 115 110 93   Resp: 15 16 12   Temp: 97.8 °F (36.6 °C)     TempSrc: Oral     SpO2: 100% 100% 100%   Weight: 127 lb (57.6 kg)     Height: 5' 3\" (1.6 m)         11:53 PM: The patient was seen and evaluated. Labs were ordered. The patient was given Bomont while in the ED. MDM:  The patient is a 23-year-old female who is here for seizure. Neurological exam is unremarkable. Patient has a minor headache.   Patient likely does not have a cerebral

## 2017-11-16 ENCOUNTER — APPOINTMENT (OUTPATIENT)
Dept: NON INVASIVE DIAGNOSTICS | Age: 27
DRG: 101 | End: 2017-11-16
Payer: COMMERCIAL

## 2017-11-16 LAB
ALBUMIN SERPL-MCNC: 4.6 G/DL (ref 3.5–5.1)
ALP BLD-CCNC: 52 U/L (ref 38–126)
ALT SERPL-CCNC: 22 U/L (ref 11–66)
ANION GAP SERPL CALCULATED.3IONS-SCNC: 13 MEQ/L (ref 8–16)
AST SERPL-CCNC: 16 U/L (ref 5–40)
BASOPHILS # BLD: 0.7 %
BASOPHILS ABSOLUTE: 0 THOU/MM3 (ref 0–0.1)
BILIRUB SERPL-MCNC: 0.5 MG/DL (ref 0.3–1.2)
BUN BLDV-MCNC: 7 MG/DL (ref 7–22)
CALCIUM SERPL-MCNC: 9.4 MG/DL (ref 8.5–10.5)
CHLORIDE BLD-SCNC: 103 MEQ/L (ref 98–111)
CO2: 26 MEQ/L (ref 23–33)
CREAT SERPL-MCNC: 0.7 MG/DL (ref 0.4–1.2)
EOSINOPHIL # BLD: 0.8 %
EOSINOPHILS ABSOLUTE: 0 THOU/MM3 (ref 0–0.4)
GFR SERPL CREATININE-BSD FRML MDRD: > 90 ML/MIN/1.73M2
GLUCOSE BLD-MCNC: 110 MG/DL (ref 70–108)
HCT VFR BLD CALC: 41.1 % (ref 37–47)
HEMOGLOBIN: 13.9 GM/DL (ref 12–16)
LACTIC ACID: 1.3 MMOL/L (ref 0.5–2.2)
LYMPHOCYTES # BLD: 26.1 %
LYMPHOCYTES ABSOLUTE: 1.4 THOU/MM3 (ref 1–4.8)
MAGNESIUM: 1.9 MG/DL (ref 1.6–2.4)
MCH RBC QN AUTO: 29.3 PG (ref 27–31)
MCHC RBC AUTO-ENTMCNC: 33.9 GM/DL (ref 33–37)
MCV RBC AUTO: 86.5 FL (ref 81–99)
MONOCYTES # BLD: 7.7 %
MONOCYTES ABSOLUTE: 0.4 THOU/MM3 (ref 0.4–1.3)
NUCLEATED RED BLOOD CELLS: 0 /100 WBC
PDW BLD-RTO: 13 % (ref 11.5–14.5)
PHOSPHORUS: 3.5 MG/DL (ref 2.4–4.7)
PLATELET # BLD: 271 THOU/MM3 (ref 130–400)
PMV BLD AUTO: 8.6 MCM (ref 7.4–10.4)
POTASSIUM SERPL-SCNC: 3.7 MEQ/L (ref 3.5–5.2)
RBC # BLD: 4.76 MILL/MM3 (ref 4.2–5.4)
SEG NEUTROPHILS: 64.7 %
SEGMENTED NEUTROPHILS ABSOLUTE COUNT: 3.4 THOU/MM3 (ref 1.8–7.7)
SODIUM BLD-SCNC: 142 MEQ/L (ref 135–145)
T4 FREE: 1.04 NG/DL (ref 0.93–1.76)
TOTAL PROTEIN: 7.2 G/DL (ref 6.1–8)
TSH SERPL DL<=0.05 MIU/L-ACNC: 0.33 UIU/ML (ref 0.4–4.2)
WBC # BLD: 5.3 THOU/MM3 (ref 4.8–10.8)

## 2017-11-16 PROCEDURE — 93660 TILT TABLE EVALUATION: CPT

## 2017-11-16 PROCEDURE — 80050 GENERAL HEALTH PANEL: CPT

## 2017-11-16 PROCEDURE — 6370000000 HC RX 637 (ALT 250 FOR IP): Performed by: INTERNAL MEDICINE

## 2017-11-16 PROCEDURE — 6370000000 HC RX 637 (ALT 250 FOR IP): Performed by: ANESTHESIOLOGY

## 2017-11-16 PROCEDURE — 2580000003 HC RX 258: Performed by: ANESTHESIOLOGY

## 2017-11-16 PROCEDURE — 83605 ASSAY OF LACTIC ACID: CPT

## 2017-11-16 PROCEDURE — 6370000000 HC RX 637 (ALT 250 FOR IP): Performed by: PSYCHIATRY & NEUROLOGY

## 2017-11-16 PROCEDURE — 84439 ASSAY OF FREE THYROXINE: CPT

## 2017-11-16 PROCEDURE — 1210000002 HC MED SURG R&B - NEUROSCIENCE

## 2017-11-16 PROCEDURE — 99222 1ST HOSP IP/OBS MODERATE 55: CPT | Performed by: INTERNAL MEDICINE

## 2017-11-16 PROCEDURE — 84100 ASSAY OF PHOSPHORUS: CPT

## 2017-11-16 PROCEDURE — 83735 ASSAY OF MAGNESIUM: CPT

## 2017-11-16 PROCEDURE — 36415 COLL VENOUS BLD VENIPUNCTURE: CPT

## 2017-11-16 PROCEDURE — 99232 SBSQ HOSP IP/OBS MODERATE 35: CPT | Performed by: PSYCHIATRY & NEUROLOGY

## 2017-11-16 RX ADMIN — METOPROLOL TARTRATE 12.5 MG: 25 TABLET ORAL at 20:54

## 2017-11-16 RX ADMIN — Medication 10 ML: at 20:52

## 2017-11-16 RX ADMIN — CLONAZEPAM 1.5 MG: 1 TABLET ORAL at 12:07

## 2017-11-16 RX ADMIN — MONTELUKAST SODIUM 10 MG: 10 TABLET, FILM COATED ORAL at 20:51

## 2017-11-16 RX ADMIN — LORAZEPAM 1 MG: 1 TABLET ORAL at 03:35

## 2017-11-16 RX ADMIN — BUSPIRONE HYDROCHLORIDE 10 MG: 10 TABLET ORAL at 20:50

## 2017-11-16 RX ADMIN — BUSPIRONE HYDROCHLORIDE 10 MG: 10 TABLET ORAL at 11:54

## 2017-11-16 RX ADMIN — DESVENLAFAXINE SUCCINATE 50 MG: 50 TABLET, FILM COATED, EXTENDED RELEASE ORAL at 11:54

## 2017-11-16 RX ADMIN — FAMOTIDINE 20 MG: 20 TABLET, FILM COATED ORAL at 11:54

## 2017-11-16 RX ADMIN — FAMOTIDINE 20 MG: 20 TABLET, FILM COATED ORAL at 20:50

## 2017-11-16 RX ADMIN — LAMOTRIGINE 25 MG: 25 TABLET ORAL at 11:53

## 2017-11-16 RX ADMIN — CLONAZEPAM 1.5 MG: 1 TABLET ORAL at 20:50

## 2017-11-16 ASSESSMENT — PAIN SCALES - GENERAL
PAINLEVEL_OUTOF10: 0
PAINLEVEL_OUTOF10: 0

## 2017-11-16 NOTE — PLAN OF CARE
Problem: Pain:  Goal: Control of acute pain  Control of acute pain   Outcome: Ongoing  Patient stating pain is improving today. Problem: Skin Integrity:  Goal: Skin integrity will stabilize  Skin integrity will stabilize   Outcome: Ongoing  No skin issues to report. Patient able to turn self in bed and ambulates frequently. Problem: Falls - Risk of:  Goal: Absence of physical injury  Absence of physical injury  Outcome: Ongoing  No falls at this time. Patient instructed to call when in need of assistance and before getting up. Call light in reach at all times and bed alarm on. Problem: Safety:  Goal: Ability to remain free from injury will improve  Ability to remain free from injury will improve  Outcome: Ongoing  Seizure precautions in place. All bed rails padded and patient on telemetry. Comments: Care plan reviewed with patient and rn. Patient and rn verbalize understanding of the plan of care and contribute to goal setting.

## 2017-11-16 NOTE — PROGRESS NOTES
at 11/16/17 0412   Gross per 24 hour   Intake              300 ml   Output                0 ml   Net              300 ml       Diet:  DIET GENERAL;    Exam:  /73   Pulse 99   Temp 98.6 °F (37 °C) (Oral)   Resp 16   Ht 5' 3\" (1.6 m)   Wt 121 lb 14.4 oz (55.3 kg)   LMP 10/21/2017 (Approximate)   SpO2 98%   BMI 21.59 kg/m²     General appearance: No apparent distress, appears stated age and cooperative. HEENT: Pupils equal, round, and reactive to light. Conjunctivae/corneas clear. Neck: Supple, with full range of motion. No jugular venous distention. Trachea midline. Respiratory:  Normal respiratory effort. Clear to auscultation, bilaterally without Rales/Wheezes/Rhonchi. Cardiovascular: Regular rate and rhythm with normal S1/S2 without murmurs, rubs or gallops. Slightly tachy. Abdomen: Soft, flat, non-tender, non-distended with normal bowel sounds. Musculoskeletal: No clubbing, cyanosis or edema bilaterally. Full range of motion without deformity. Skin: Skin color, texture, turgor normal.  No rashes or lesions. Neurologic:  Neurovascularly intact without any focal sensory/motor deficits. Cranial nerves: II-XII intact, grossly non-focal.  Psychiatric: Alert and oriented, thought content appropriate, normal insight  Capillary Refill: Brisk,< 3 seconds   Peripheral Pulses: +2 palpable, equal bilaterally       Labs:   Recent Labs      11/15/17   0024  11/16/17 0451   WBC  5.6  5.3   HGB  13.7  13.9   HCT  40.8  41.1   PLT  295  271     Recent Labs      11/15/17   0024  11/16/17 0451   NA  141  142   K  3.7  3.7   CL  102  103   CO2  24  26   BUN  6*  7   CREATININE  0.6  0.7   CALCIUM  9.4  9.4   PHOS   --   3.5     Recent Labs      11/16/17 0451   AST  16   ALT  22   BILITOT  0.5   ALKPHOS  52     No results for input(s): INR in the last 72 hours. No results for input(s): Jessica Neha in the last 72 hours.     Urinalysis:    Lab Results   Component Value Date    NITRU NEGATIVE 07/09/2015    WBCUA > 200 06/24/2015    BACTERIA MANY 06/24/2015    RBCUA 3-5 06/24/2015    BLOODU NEGATIVE 07/09/2015    SPECGRAV 1.020 07/23/2014    GLUCOSEU NEGATIVE 07/09/2015       Radiology:  MRI Brain W WO Contrast   Final Result       Stable appearance of the brain without acute intracranial abnormality identified. **This report has been created using voice recognition software. It may contain minor errors which are inherent in voice recognition technology. **         Final report electronically signed by Dr. Kofi Baker on 11/15/2017 2:17 PM          Diet: DIET GENERAL;    DVT prophylaxis: [x] Lovenox                                 [] SCDs                                 [] SQ Heparin                                 [] Encourage ambulation           [] Already on Anticoagulation     Disposition:    [x] Home       [] TCU       [] Rehab       [] Psych       [] SNF       [] [place]       [] Other-    Code Status: Full Code    PT/OT Eval Status: Ambulates independantly    Assessment/Plan:    Anticipated Discharge in : 24-48 hours    Active Hospital Problems    Diagnosis Date Noted    New onset seizure ([facility] [address] [R56.9] 11/15/2017    ZAIDA (generalized anxiety disorder) [F41.1] 06/26/2017    Recurrent major depressive disorder, in full remission ([facility] [address] [F33.42] 06/26/2017    Seasonal allergic rhinitis [J30.2] 06/26/2017       1. Seizures new onset, now on Lamictal step up dosing  - States she has had sleep issues and increased stress. - Neuro seeing.   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   - Labs stable. - Follows with Yaritza Espitia for PCP, verbalizes intention to follow with Dr. Cruz Lujan at discharge also.

## 2017-11-16 NOTE — CONSULTS
[facility]        Cardiology Consultation/ History of present illness      Patient:  Shiva Mart  YOB: 1990    MRN: [id number]     Acct: [de-identified]    PCP: Kwesi Monreal NP    Date of Admission: 11/14/2017      Chief Complaint:     new onset of seizure      History Of Present Illness:    32 y.o. female who presented to [address] [place] [address] with second seizure episode. She is being evaluated by neurology service. Allergy consultation was requested for tachycardia. Past Medical History:          Diagnosis Date    Allergy     Anxiety 2007    Depression     Mesenteric adenitis     Ovarian cyst     Psychiatric problem     Seizures ([facility]        Past Surgical History:          Procedure Laterality Date    ADENOIDECTOMY      BREAST BIOPSY      fibroadenoma    BREAST LUMPECTOMY  2009    LEEP      MOUTH SURGERY  2009    TONSILLECTOMY      WISDOM TOOTH EXTRACTION  2009       Medications Prior to Admission:      Prior to Admission medications    Medication Sig Start Date End Date Taking?  Authorizing Provider   zolpidem (AMBIEN) 10 MG tablet Take 1 tablet by mouth nightly as needed for Sleep 10/27/17  Yes Kwesi Monreal NP   busPIRone (BUSPAR) 10 MG tablet Take 1 tablet by mouth 2 times daily 10/12/17  Yes Kwesi Monreal NP   fluticasone (FLONASE) 50 MCG/ACT nasal spray SHAKE LIQUID AND USE 2 SPRAYS BY NASAL ROUTE DAILY AS NEEDED FOR ALLERGIES 8/14/17  Yes Kwesi Monreal NP   olopatadine (PATANOL) 0.1 % ophthalmic solution Place 1 drop into both eyes 2 times daily as needed for Allergies 6/26/17  Yes Kwesi Monreal NP   montelukast (SINGULAIR) 10 MG tablet Take 1 tablet by mouth nightly 6/26/17  Yes Kwesi Monreal NP   desvenlafaxine succinate (PRISTIQ) 50 MG TB24 extended release tablet Take 1 tablet by mouth daily 6/26/17  Yes Kwesi Monreal NP   clonazePAM (KLONOPIN) 1 MG tablet Take 1.5 tablets by mouth 2 times daily 6/26/17  Yes Darnell Juárez organomegaly  Neurological - alert, oriented, normal speech, no focal findings or movement disorder noted  Musculoskeletal - no joint tenderness, deformity or swelling  Extremities - peripheral pulses normal, no pedal edema, no clubbing or cyanosis  Skin - normal coloration and turgor, no rashes, no suspicious skin lesions noted      EKG   Sinus tachycardia    Echo   Normal EF    Tilt table test  No orthostatic changes  No vasovagal syndrome      Recent Labs      11/15/17   0024   WBC  5.6   HGB  13.7   HCT  40.8   MCV  85.4   PLT  295       Recent Labs      11/15/17   0024   NA  141   K  3.7   CL  102   CO2  24   BUN  6*   CREATININE  0.6       Lab Results   Component Value Date    ALKPHOS 71 11/13/2016    ALT 74 11/13/2016    AST 40 11/13/2016    PROT 7.0 11/13/2016    BILITOT 0.5 11/13/2016    BILIDIR <0.2 11/13/2016    LABALBU 4.2 11/13/2016       Lab Results   Component Value Date    LABA1C 5.6 09/27/2011       Lab Results   Component Value Date    TRIG 41 09/27/2011    HDL 58 09/27/2011    LDLCALC 59 09/27/2011       Lab Results   Component Value Date    TSH 1.721 09/27/2011         Mri Brain W Wo Contrast    Result Date: 11/15/2017  PROCEDURE: MRI BRAIN W WO CONTRAST CLINICAL INFORMATION: SEIZURE, NEW, 18-39 YO, NO TRAUMA, . New onset seizures and headaches. COMPARISON: MRI of the brain dated April 13, 2010 TECHNIQUE: Multiplanar and multiple spin echo T1 and T2-weighted images were obtained through the brain before and after the administration of 10 mL ProHance intravenous contrast. Additional sequences include coronal high-resolution FLAIR and coronal T2-weighted images through the temporal lobes. FINDINGS: The brain parenchymal volume is preserved. No abnormal signal alteration is identified within the brain parenchyma. No restricted diffusion or abnormal susceptibility is present. The ventricles are midline without evidence of hydrocephalus.  No mass, mass  effect or extra-axial fluid collection is identified. The basal cisterns and visualized vascular flow voids are patent. No abnormal intracranial enhancement is present. The bilateral hippocampal structures are symmetric. No abnormal signal alteration is identified. The fornices and mamillary bodies are also symmetric and within normal limits. The visualized orbits and temporal bone structures are unremarkable. The paranasal sinuses are within normal limits. Stable appearance of the brain without acute intracranial abnormality identified. **This report has been created using voice recognition software. It may contain minor errors which are inherent in voice recognition technology. ** Final report electronically signed by Dr. Dennis Medina on 11/15/2017 2:17 PM        Assessment/Plan:    Sinus tachycardia  Check TSH  Tilt table test was normal  Normal EF  No valvular heart disease  No ischemia on EKG    I recommend to add small dose of beta blocker. Code Status: Full Code    Thank you for allowing me to participate in the care of your patient.  Please don't hesitate to contact me regarding any further issues related to the patient care       Nain Javier MD, Sylwia Herrera, RYAN  Electronically signed 11/15/2017 at 11:52 PM

## 2017-11-16 NOTE — PLAN OF CARE
Problem: Skin Integrity:  Goal: Skin integrity will stabilize  Skin integrity will stabilize  Outcome: Ongoing  Patient's skin is appropriate color for ethnicity, intact, warm and dry. Problem: Discharge Planning:  Goal: Patients continuum of care needs are met  Patients continuum of care needs are met  Outcome: Ongoing  Discharge planning in process and discussed with patient/family. Social work consulted for any additional needs. Care manager aware of discharge needs. Comments: Care plan reviewed with patient. Patient verbalize understanding of the plan of care and contribute to goal setting.

## 2017-11-17 ENCOUNTER — TELEPHONE (OUTPATIENT)
Dept: FAMILY MEDICINE CLINIC | Age: 27
End: 2017-11-17

## 2017-11-17 VITALS
WEIGHT: 127 LBS | OXYGEN SATURATION: 92 % | SYSTOLIC BLOOD PRESSURE: 114 MMHG | HEART RATE: 81 BPM | TEMPERATURE: 98.1 F | HEIGHT: 63 IN | BODY MASS INDEX: 22.5 KG/M2 | RESPIRATION RATE: 16 BRPM | DIASTOLIC BLOOD PRESSURE: 72 MMHG

## 2017-11-17 PROCEDURE — 93225 XTRNL ECG REC<48 HRS REC: CPT

## 2017-11-17 PROCEDURE — 6370000000 HC RX 637 (ALT 250 FOR IP): Performed by: ANESTHESIOLOGY

## 2017-11-17 PROCEDURE — 6370000000 HC RX 637 (ALT 250 FOR IP): Performed by: INTERNAL MEDICINE

## 2017-11-17 PROCEDURE — 99232 SBSQ HOSP IP/OBS MODERATE 35: CPT | Performed by: PSYCHIATRY & NEUROLOGY

## 2017-11-17 PROCEDURE — 6370000000 HC RX 637 (ALT 250 FOR IP): Performed by: PSYCHIATRY & NEUROLOGY

## 2017-11-17 PROCEDURE — 99239 HOSP IP/OBS DSCHRG MGMT >30: CPT | Performed by: INTERNAL MEDICINE

## 2017-11-17 PROCEDURE — 93226 XTRNL ECG REC<48 HR SCAN A/R: CPT

## 2017-11-17 RX ORDER — LAMOTRIGINE 25 MG/1
TABLET ORAL
Qty: 120 TABLET | Refills: 1 | Status: SHIPPED | OUTPATIENT
Start: 2017-11-17 | End: 2017-12-20

## 2017-11-17 RX ADMIN — CLONAZEPAM 1.5 MG: 1 TABLET ORAL at 09:20

## 2017-11-17 RX ADMIN — LAMOTRIGINE 25 MG: 25 TABLET ORAL at 09:20

## 2017-11-17 RX ADMIN — DESVENLAFAXINE SUCCINATE 50 MG: 50 TABLET, FILM COATED, EXTENDED RELEASE ORAL at 09:20

## 2017-11-17 RX ADMIN — METOPROLOL TARTRATE 12.5 MG: 25 TABLET ORAL at 09:20

## 2017-11-17 RX ADMIN — BUSPIRONE HYDROCHLORIDE 10 MG: 10 TABLET ORAL at 09:20

## 2017-11-17 RX ADMIN — FAMOTIDINE 20 MG: 20 TABLET, FILM COATED ORAL at 09:20

## 2017-11-17 ASSESSMENT — PAIN SCALES - GENERAL
PAINLEVEL_OUTOF10: 0
PAINLEVEL_OUTOF10: 0

## 2017-11-17 NOTE — PROGRESS NOTES
Followed by   Nelly Macdonald ON 12/13/2017] lamoTRIgine  100 mg Oral Daily       Data:   CBC:   Recent Labs      11/15/17   0024  11/16/17   0451   WBC  5.6  5.3   HGB  13.7  13.9   PLT  295  271     BMP:  Recent Labs      11/15/17   0024  11/16/17 0451   NA  141  142   K  3.7  3.7   CL  102  103   CO2  24  26   BUN  6*  7   CREATININE  0.6  0.7   GLUCOSE  129*  110*         Assessment:  Principal Problem:    Seizure (HCC)  Active Problems:    ZAIDA (generalized anxiety disorder)    Recurrent major depressive disorder, in full remission (HCC)    Seasonal allergic rhinitis    Sinus tachycardia  The patient is doing well, she denies any new events. she is tolerating the Lamictal . She will still need to undergo workup for syncope versus seizure. We will arrange for a tilt table study, and a Holter monitor to be applied and discharged. She is aware about the differential diagnosis of her symptoms, and workup of proposed. She is also aware of restrictions related to seizure new diagnosis. She asked questions reflecting understanding. She was agreeable with the following plan:    Plan:    1. Continue with lamictal titration as outpatient. Lamictal 25 mg daily for 2 weeks then 50 mg daily for 2 weeks then 100 mg daily thereafter. Report any rash. Call if any questions. New script sent to pharmacy. 2. No driving, swimming, operating heavy machinery or compromising heights until event free for 6 months. Report any new events. 3. Tilt table test as outpatient. 4. Holter monitor as outpatient. 5. Follow up in 3-4 weeks.      Guy Ly MD, 11/17/2017 11:13 AM

## 2017-11-17 NOTE — PROGRESS NOTES
Discharge teaching and instructions for diagnosis/procedure of seizurescompleted with patient using teachback method. AVS reviewed. Printed prescriptions given to patient. Patient voiced understanding regarding prescriptions, follow up appointments, and care of self at home. Discharged ambulatory to  home with support per family.

## 2017-11-17 NOTE — PLAN OF CARE
Problem: Pain:  Goal: Pain level will decrease  Pain level will decrease   Outcome: Ongoing  Patient able to use 0-10 pain scale. Denies pain at this time. Problem: Skin Integrity:  Goal: Skin integrity will stabilize  Skin integrity will stabilize   Outcome: Ongoing  No new skin breakdown noted this shift. Patient appropriately self turns and repositions in bed throughout the shift. Problem: Discharge Planning:  Goal: Patients continuum of care needs are met  Patients continuum of care needs are met   Outcome: Ongoing  Discharge planning remains in progress. Patient expects to be discharged to home. Patient remains involved in discharge planning. Problem: Falls - Risk of:  Goal: Will remain free from falls  Will remain free from falls   Outcome: Ongoing  Patient absent of falls this shift. Bed in lowest position, side rails up 2/4. Call-light within reach. Patient instructed to use call-light to get up. Non-skid footwear in place. Problem: Safety:  Goal: Ability to remain free from injury will improve  Ability to remain free from injury will improve   Outcome: Ongoing  Seizure precautions remain in place. Suction at bedside. Encouraged rest for patient. Comments: Care plan reviewed with patient. Patient verbalizes understanding of the plan of care and contributes to goal setting.

## 2017-11-18 NOTE — DISCHARGE SUMMARY
Hospitalist discharge Note    Patient:  Nicky Roe      Unit/Bed:4A-03/003-A    YOB: 1990    MRN: [id number]       Acct: [de-identified]     PCP: Alecia Ball NP    Date of Admission: 11/14/2017    Chief Complaint: Seizures    Hospital Course: Admitted for new onset seizures. She admits that she had a syncopal episode in May while in [place], was thought to be due to stress with negative CT scan. She fell out of bed and was seizing at home, found on the floor by her Father. Seizure duration approx 45 seconds with 5 minute postictal confusion. She also had a headache 6-7/10 she feels may have been from hitting her head on the wood floor during her seizure. Subjective: She denies headache at this time. No further seizure episodes. EEG abnormal. Had tilt table- negative  eeg - positive  On lamictal per neuro  No more seizures> 24 hrs  Lopressor helping with tachycardia    Medications:  Reviewed  An After Visit Summary was printed and given to the patient. Medication List      START taking these medications    lamoTRIgine 25 MG tablet  Commonly known as:  LAMICTAL  Start Lamictal 25 mg daily for 2 weeks then 50 mg daily for 2 weeks then 100 mg daily thereafter. Report any rash. Call if any questions.      metoprolol tartrate 25 MG tablet  Commonly known as:  LOPRESSOR  Take 0.5 tablets by mouth 2 times daily        CONTINUE taking these medications    busPIRone 10 MG tablet  Commonly known as:  BUSPAR  Take 1 tablet by mouth 2 times daily     clonazePAM 1 MG tablet  Commonly known as:  KLONOPIN  Take 1.5 tablets by mouth 2 times daily     desvenlafaxine succinate 50 MG Tb24 extended release tablet  Commonly known as:  PRISTIQ  Take 1 tablet by mouth daily     fluticasone 50 MCG/ACT nasal spray  Commonly known as:  FLONASE  SHAKE LIQUID AND USE 2 SPRAYS BY NASAL ROUTE DAILY AS NEEDED FOR ALLERGIES     LORazepam 1 MG tablet  Commonly known as:  ATIVAN  Take 1 tablet by mouth every 8 hours 06/26/2017    Seasonal allergic rhinitis [J30.2] 06/26/2017       1. Seizures new onset, now on Lamictal step up dosing  - States she has had sleep issues and increased stress. - Neuro seeing.   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   - Labs stable.   -     2. Anxiety, significant, pt on Buspra, Klonopin, has Xanax PRN. 3. Depression, on Pristiq.    4. Seasonal allergic rhinitis, on flonase montelukast and patanol PRN    Ok for discharge on lamictal  Lopressor for sinus tachycardia  Ok for discharge per neuro and cardio    Discharge time:- 35 mins     Electronically signed by Nery Richards MD on 11/18/2017 at 1:45 PM

## 2017-11-20 ENCOUNTER — TELEPHONE (OUTPATIENT)
Dept: FAMILY MEDICINE CLINIC | Age: 27
End: 2017-11-20

## 2017-11-21 LAB — VITAMIN B6: 30.7 NMOL/L (ref 20–125)

## 2017-11-22 ENCOUNTER — OFFICE VISIT (OUTPATIENT)
Dept: FAMILY MEDICINE CLINIC | Age: 27
End: 2017-11-22
Payer: COMMERCIAL

## 2017-11-22 VITALS
TEMPERATURE: 98.2 F | SYSTOLIC BLOOD PRESSURE: 102 MMHG | BODY MASS INDEX: 23.11 KG/M2 | RESPIRATION RATE: 16 BRPM | DIASTOLIC BLOOD PRESSURE: 60 MMHG | HEIGHT: 63 IN | HEART RATE: 68 BPM | WEIGHT: 130.4 LBS

## 2017-11-22 DIAGNOSIS — R56.9 SEIZURE (HCC): Primary | ICD-10-CM

## 2017-11-22 DIAGNOSIS — F41.0 PANIC ATTACK: ICD-10-CM

## 2017-11-22 DIAGNOSIS — F41.1 GAD (GENERALIZED ANXIETY DISORDER): ICD-10-CM

## 2017-11-22 DIAGNOSIS — G47.26 SHIFT WORK SLEEP DISORDER: ICD-10-CM

## 2017-11-22 DIAGNOSIS — E05.90 SUBCLINICAL HYPERTHYROIDISM: ICD-10-CM

## 2017-11-22 DIAGNOSIS — F33.42 RECURRENT MAJOR DEPRESSIVE DISORDER, IN FULL REMISSION (HCC): ICD-10-CM

## 2017-11-22 DIAGNOSIS — R00.2 HEART PALPITATIONS: ICD-10-CM

## 2017-11-22 PROCEDURE — 99495 TRANSJ CARE MGMT MOD F2F 14D: CPT | Performed by: NURSE PRACTITIONER

## 2017-11-22 RX ORDER — DESVENLAFAXINE 50 MG/1
50 TABLET, EXTENDED RELEASE ORAL DAILY
Qty: 30 TABLET | Refills: 11 | Status: SHIPPED | OUTPATIENT
Start: 2017-11-22 | End: 2019-01-04 | Stop reason: ALTCHOICE

## 2017-11-22 RX ORDER — ZOLPIDEM TARTRATE 10 MG/1
10 TABLET ORAL NIGHTLY PRN
Qty: 30 TABLET | Refills: 5 | Status: SHIPPED | OUTPATIENT
Start: 2017-11-22 | End: 2018-05-22 | Stop reason: SDUPTHER

## 2017-11-22 RX ORDER — LORAZEPAM 1 MG/1
1 TABLET ORAL EVERY 8 HOURS PRN
Qty: 30 TABLET | Refills: 0 | Status: SHIPPED | OUTPATIENT
Start: 2017-11-22 | End: 2017-12-18 | Stop reason: SDUPTHER

## 2017-11-22 NOTE — PROGRESS NOTES
130 lb 6.4 oz (59.1 kg)   11/17/17 127 lb (57.6 kg)   10/12/17 127 lb 8 oz (57.8 kg)     BP Readings from Last 3 Encounters:   11/22/17 102/60   11/17/17 114/72   10/12/17 124/84        Inpatient course: Discharge summary reviewed- see chart. Chief Complaint   Patient presents with    Follow-Up from [address] d/c 11/17/2017 Seizures       HPI  Review of Systems    Non face to face  following discharge, date last encounter closed (first attempt may have been earlier): 11/20/2017  9:13 AM 11/20/2017  9:13 AM    Call initiated 2 business days of discharge: Yes     Interval history/Current status:       Physical Exam        Assessment/Plan:  Bobby Kelsey was seen today for follow-up from hospital.    Diagnoses and all orders for this visit:    Shift work sleep disorder  -     zolpidem (AMBIEN) 10 MG tablet; Take 1 tablet by mouth nightly as needed for Sleep . Panic attack  -     LORazepam (ATIVAN) 1 MG tablet; Take 1 tablet by mouth every 8 hours as needed for Anxiety . Recurrent major depressive disorder, in full remission (HCC)  -     desvenlafaxine succinate (PRISTIQ) 50 MG TB24 extended release tablet;  Take 1 tablet by mouth daily          Medical Decision Making: moderate complexity

## 2017-11-26 ASSESSMENT — ENCOUNTER SYMPTOMS
ABDOMINAL PAIN: 0
COUGH: 0
NAUSEA: 0
SHORTNESS OF BREATH: 0

## 2017-11-26 NOTE — PROGRESS NOTES
Denies palpitations. Review of Systems:  Review of Systems   Constitutional: Negative for chills and fever. HENT: Negative. Respiratory: Negative for cough and shortness of breath. Cardiovascular: Positive for palpitations. Negative for chest pain. Gastrointestinal: Negative for abdominal pain and nausea. Skin: Negative for rash. Neurological: Positive for seizures. Negative for dizziness, light-headedness and headaches. Psychiatric/Behavioral: Positive for sleep disturbance. The patient is nervous/anxious. Physical Exam:  Physical Exam   Constitutional: She is oriented to person, place, and time. Vital signs are normal. She appears well-developed and well-nourished. She is active. She does not have a sickly appearance. No distress. HENT:   Right Ear: Tympanic membrane normal.   Left Ear: Tympanic membrane normal.   Nose: Nose normal.   Mouth/Throat: Oropharynx is clear and moist and mucous membranes are normal.   Cardiovascular: Normal rate, regular rhythm, S1 normal, S2 normal, normal heart sounds and normal pulses. Exam reveals no S3. No murmur heard. Pulmonary/Chest: Effort normal and breath sounds normal. She has no decreased breath sounds. She has no wheezes. She has no rhonchi. Abdominal: Soft. Bowel sounds are normal. There is no tenderness. Neurological: She is alert and oriented to person, place, and time. Psychiatric: Her speech is normal. Thought content normal. Her mood appears anxious. She is hyperactive. She is not slowed and not withdrawn. Cognition and memory are normal. She expresses impulsivity. She does not exhibit a depressed mood. Initial post-discharge communication occurred between nurse and patient on 11/20/17 - see documentation in chart: telephone encounter.     Assessment/Plan:  Braden Sanches was seen today for follow-up from hospital.    Diagnoses and all orders for this visit:    Seizure [facility])    Heart palpitations    Recurrent major depressive

## 2017-12-04 ENCOUNTER — TELEPHONE (OUTPATIENT)
Dept: NEUROLOGY | Age: 27
End: 2017-12-04

## 2017-12-12 ENCOUNTER — CARE COORDINATOR VISIT (OUTPATIENT)
Dept: CASE MANAGEMENT | Age: 27
End: 2017-12-12

## 2017-12-12 ENCOUNTER — HOSPITAL ENCOUNTER (EMERGENCY)
Age: 27
Discharge: HOME | End: 2017-12-12
Attending: FAMILY MEDICINE
Payer: COMMERCIAL

## 2017-12-12 VITALS
WEIGHT: 127 LBS | DIASTOLIC BLOOD PRESSURE: 79 MMHG | SYSTOLIC BLOOD PRESSURE: 107 MMHG | BODY MASS INDEX: 22.5 KG/M2 | HEIGHT: 63 IN | HEART RATE: 115 BPM | RESPIRATION RATE: 16 BRPM | TEMPERATURE: 98 F | OXYGEN SATURATION: 100 %

## 2017-12-12 DIAGNOSIS — R56.9 SEIZURE (HCC): Primary | ICD-10-CM

## 2017-12-12 LAB
ALBUMIN SERPL-MCNC: 4.7 G/DL (ref 3.5–5.1)
ALP BLD-CCNC: 49 U/L (ref 38–126)
ALT SERPL-CCNC: 13 U/L (ref 11–66)
ANION GAP SERPL CALCULATED.3IONS-SCNC: 17 MEQ/L (ref 8–16)
AST SERPL-CCNC: 15 U/L (ref 5–40)
BASOPHILS # BLD: 0.3 %
BASOPHILS ABSOLUTE: 0 THOU/MM3 (ref 0–0.1)
BILIRUB SERPL-MCNC: 0.3 MG/DL (ref 0.3–1.2)
BILIRUBIN DIRECT: < 0.2 MG/DL (ref 0–0.3)
BUN BLDV-MCNC: 7 MG/DL (ref 7–22)
CALCIUM SERPL-MCNC: 9.5 MG/DL (ref 8.5–10.5)
CHLORIDE BLD-SCNC: 100 MEQ/L (ref 98–111)
CO2: 21 MEQ/L (ref 23–33)
CREAT SERPL-MCNC: 0.8 MG/DL (ref 0.4–1.2)
EOSINOPHIL # BLD: 0.9 %
EOSINOPHILS ABSOLUTE: 0.1 THOU/MM3 (ref 0–0.4)
GFR SERPL CREATININE-BSD FRML MDRD: 84 ML/MIN/1.73M2
GLUCOSE BLD-MCNC: 129 MG/DL (ref 70–108)
HCT VFR BLD CALC: 38.9 % (ref 37–47)
HEMOGLOBIN: 13.5 GM/DL (ref 12–16)
LYMPHOCYTES # BLD: 14.5 %
LYMPHOCYTES ABSOLUTE: 1.4 THOU/MM3 (ref 1–4.8)
MCH RBC QN AUTO: 30.3 PG (ref 27–31)
MCHC RBC AUTO-ENTMCNC: 34.9 GM/DL (ref 33–37)
MCV RBC AUTO: 87 FL (ref 81–99)
MONOCYTES # BLD: 7 %
MONOCYTES ABSOLUTE: 0.7 THOU/MM3 (ref 0.4–1.3)
NUCLEATED RED BLOOD CELLS: 0 /100 WBC
OSMOLALITY CALCULATION: 275.3 MOSMOL/KG (ref 275–300)
PDW BLD-RTO: 12.6 % (ref 11.5–14.5)
PLATELET # BLD: 282 THOU/MM3 (ref 130–400)
PMV BLD AUTO: 7.9 MCM (ref 7.4–10.4)
POTASSIUM SERPL-SCNC: 4 MEQ/L (ref 3.5–5.2)
PREGNANCY, SERUM: NEGATIVE
RBC # BLD: 4.47 MILL/MM3 (ref 4.2–5.4)
SEG NEUTROPHILS: 77.3 %
SEGMENTED NEUTROPHILS ABSOLUTE COUNT: 7.3 THOU/MM3 (ref 1.8–7.7)
SODIUM BLD-SCNC: 138 MEQ/L (ref 135–145)
TOTAL PROTEIN: 7.6 G/DL (ref 6.1–8)
WBC # BLD: 9.4 THOU/MM3 (ref 4.8–10.8)

## 2017-12-12 PROCEDURE — 99285 EMERGENCY DEPT VISIT HI MDM: CPT

## 2017-12-12 PROCEDURE — 2580000003 HC RX 258: Performed by: FAMILY MEDICINE

## 2017-12-12 PROCEDURE — 6360000002 HC RX W HCPCS: Performed by: FAMILY MEDICINE

## 2017-12-12 PROCEDURE — 80053 COMPREHEN METABOLIC PANEL: CPT

## 2017-12-12 PROCEDURE — 85025 COMPLETE CBC W/AUTO DIFF WBC: CPT

## 2017-12-12 PROCEDURE — 84703 CHORIONIC GONADOTROPIN ASSAY: CPT

## 2017-12-12 PROCEDURE — 96374 THER/PROPH/DIAG INJ IV PUSH: CPT

## 2017-12-12 PROCEDURE — 36415 COLL VENOUS BLD VENIPUNCTURE: CPT

## 2017-12-12 PROCEDURE — 6370000000 HC RX 637 (ALT 250 FOR IP): Performed by: FAMILY MEDICINE

## 2017-12-12 PROCEDURE — 82248 BILIRUBIN DIRECT: CPT

## 2017-12-12 PROCEDURE — 96361 HYDRATE IV INFUSION ADD-ON: CPT

## 2017-12-12 RX ORDER — SODIUM CHLORIDE 9 MG/ML
INJECTION, SOLUTION INTRAVENOUS CONTINUOUS
Status: DISCONTINUED | OUTPATIENT
Start: 2017-12-12 | End: 2017-12-12 | Stop reason: HOSPADM

## 2017-12-12 RX ORDER — LACOSAMIDE 50 MG/1
50 TABLET ORAL ONCE
Status: COMPLETED | OUTPATIENT
Start: 2017-12-12 | End: 2017-12-12

## 2017-12-12 RX ORDER — LACOSAMIDE 50 MG/1
50 TABLET ORAL 2 TIMES DAILY
Status: DISCONTINUED | OUTPATIENT
Start: 2017-12-12 | End: 2017-12-12

## 2017-12-12 RX ORDER — LORAZEPAM 2 MG/ML
1 INJECTION INTRAMUSCULAR ONCE
Status: COMPLETED | OUTPATIENT
Start: 2017-12-12 | End: 2017-12-12

## 2017-12-12 RX ORDER — LACOSAMIDE 50 MG/1
50 TABLET ORAL 2 TIMES DAILY
Qty: 60 TABLET | Refills: 0 | Status: SHIPPED | OUTPATIENT
Start: 2017-12-12 | End: 2018-05-09

## 2017-12-12 RX ADMIN — LORAZEPAM 1 MG: 2 INJECTION INTRAMUSCULAR; INTRAVENOUS at 09:50

## 2017-12-12 RX ADMIN — SODIUM CHLORIDE: 9 INJECTION, SOLUTION INTRAVENOUS at 09:45

## 2017-12-12 RX ADMIN — LACOSAMIDE 50 MG: 50 TABLET, FILM COATED ORAL at 12:11

## 2017-12-12 ASSESSMENT — PAIN SCALES - GENERAL: PAINLEVEL_OUTOF10: 7

## 2017-12-12 ASSESSMENT — PAIN DESCRIPTION - DESCRIPTORS: DESCRIPTORS: CRAMPING

## 2017-12-12 ASSESSMENT — PAIN DESCRIPTION - LOCATION: LOCATION: ABDOMEN

## 2017-12-12 ASSESSMENT — PAIN DESCRIPTION - ONSET: ONSET: ON-GOING

## 2017-12-12 ASSESSMENT — PAIN DESCRIPTION - ORIENTATION: ORIENTATION: LOWER

## 2017-12-12 ASSESSMENT — PAIN DESCRIPTION - PAIN TYPE: TYPE: ACUTE PAIN

## 2017-12-12 ASSESSMENT — ENCOUNTER SYMPTOMS
SHORTNESS OF BREATH: 0
EYE DISCHARGE: 0
ABDOMINAL PAIN: 0

## 2017-12-12 ASSESSMENT — PAIN DESCRIPTION - FREQUENCY: FREQUENCY: INTERMITTENT

## 2017-12-12 ASSESSMENT — PAIN DESCRIPTION - PROGRESSION: CLINICAL_PROGRESSION: NOT CHANGED

## 2017-12-12 NOTE — ED PROVIDER NOTES
cleocin [clindamycin hcl]; levaquin [levofloxacin]; flexeril [cyclobenzaprine]; influenza vaccines; nsaids; and tramadol. FAMILY HISTORY     indicated that her mother is alive. She indicated that her father is alive. She indicated that the status of her maternal grandmother is unknown. She indicated that the status of her maternal grandfather is unknown. She indicated that the status of her paternal grandmother is unknown. She indicated that the status of her paternal grandfather is unknown.    family history includes Allergies in her father; Asthma in her father; Breast Cancer in her maternal grandmother; Diabetes in her paternal grandfather; Heart Disease in her maternal grandfather, maternal grandmother, and paternal grandfather; High Cholesterol in her father; Other in her mother and paternal grandmother. SOCIAL HISTORY      reports that she has never smoked. She has never used smokeless tobacco. She reports that she drinks alcohol. She reports that she does not use drugs. PHYSICAL EXAM     INITIAL VITALS:  height is 5' 3\" (1.6 m) and weight is 127 lb (57.6 kg). Her oral temperature is 98 °F (36.7 °C). Her blood pressure is 118/99 (abnormal) and her pulse is 104. Her respiration is 16 and oxygen saturation is 100%. Physical Exam   Constitutional: She is oriented to person, place, and time. She appears well-developed and well-nourished. GCS 15   HENT:   Head: Normocephalic and atraumatic. Small abrasion left tongue   Eyes: Conjunctivae and EOM are normal. Pupils are equal, round, and reactive to light. Neck: Normal range of motion. Neck supple. No JVD present. No thyromegaly present. No meningismus   Cardiovascular: Normal rate and regular rhythm. Pulmonary/Chest: Effort normal and breath sounds normal.   Abdominal: Soft. There is no tenderness. Musculoskeletal: Normal range of motion. She exhibits no edema. Neurological: She is alert and oriented to person, place, and time.  She exhibits normal muscle tone. Skin: Skin is warm and dry. Psychiatric: She has a normal mood and affect. Her behavior is normal.   Nursing note and vitals reviewed. DIFFERENTIAL DIAGNOSIS:   Grand mal seizure in the setting of seizure disorder        DIAGNOSTIC RESULTS         No orders to display       LABS:   Careyí 876 - Abnormal; Notable for the following:        Result Value    CO2 21 (*)     Glucose 129 (*)     All other components within normal limits   ANION GAP - Abnormal; Notable for the following: Anion Gap 17.0 (*)     All other components within normal limits   GLOMERULAR FILTRATION RATE, ESTIMATED - Abnormal; Notable for the following:     Est, Glom Filt Rate 84 (*)     All other components within normal limits   CBC WITH AUTO DIFFERENTIAL   HEPATIC FUNCTION PANEL   HCG, SERUM, QUALITATIVE   OSMOLALITY       EMERGENCY DEPARTMENT COURSE:   Vitals:    Vitals:    12/12/17 0929 12/12/17 1019 12/12/17 1042   BP: 130/89  (!) 118/99   Pulse: 110  104   Resp: 18  16   Temp:  98 °F (36.7 °C)    TempSrc:  Oral    SpO2: 100%  100%   Weight: 127 lb (57.6 kg)     Height: 5' 3\" (1.6 m)         9:41 AM: The patient was seen and evaluated. Nursing notes reviewed    IV hydration    Ativan 1 mg IV    No further seizure activity    CBC, electrolytes, blood sugar normal    After discussion with  will start Vimpat 50 mg twice a day    Continue the Lamictal on her milligrams daily    Given first dose of Vimpat in the ER    Discharge home    CRITICAL CARE:   None      CONSULTS:  Dr. James Smalls:  None      FINAL IMPRESSION      1.  Seizure [facility])          DISPOSITION/PLAN     Discharge home    Continue Lamictal 100 mg daily    PATIENT REFERRED TO:  Pawel Gomez MD  [facility] [address] [place] [address]  [phone]    In 1 week        DISCHARGE MEDICATIONS:  New Prescriptions    LACOSAMIDE (VIMPAT) 50 MG TABS TABLET    Take 1 tablet by mouth 2

## 2017-12-12 NOTE — ED TRIAGE NOTES
Arrived to  ER for evaluation of seizure and dizziness. First seizure occurred in May then second seizure was a month ago. 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 Has hx of anxiety and stress. Feels like may be dehydrated because did not drink much last night while at work. Parents in room. Seizure today occurred while in car and witnessed by patients mother. Mother states seizure lasted approx 5-10min and patient was confused after for about 20 min. Patient states this seizure is different because was dizzy and lightheaded after which has not happened before. Follows with Dr Tara Awad for Neuro. Takes Lamictal for seizure. Dr Isabel Arnold at bedside to assess. New orders pending.

## 2017-12-13 ENCOUNTER — CARE COORDINATION (OUTPATIENT)
Dept: CASE MANAGEMENT | Age: 27
End: 2017-12-13

## 2017-12-13 NOTE — CARE COORDINATION
Attempted to reach patient for ED follow up. Unable to leave voicemail as mailbox is full. Will attempt another outreach.

## 2017-12-18 ENCOUNTER — TELEPHONE (OUTPATIENT)
Dept: FAMILY MEDICINE CLINIC | Age: 27
End: 2017-12-18

## 2017-12-18 ENCOUNTER — TELEPHONE (OUTPATIENT)
Dept: NEUROLOGY | Age: 27
End: 2017-12-18

## 2017-12-18 DIAGNOSIS — R00.0 SINUS TACHYCARDIA: Primary | ICD-10-CM

## 2017-12-18 DIAGNOSIS — F41.0 PANIC ATTACK: ICD-10-CM

## 2017-12-18 RX ORDER — LORAZEPAM 1 MG/1
1 TABLET ORAL EVERY 8 HOURS PRN
Qty: 30 TABLET | Refills: 1 | Status: SHIPPED | OUTPATIENT
Start: 2017-12-18 | End: 2018-01-17

## 2017-12-18 NOTE — TELEPHONE ENCOUNTER
Patient called reporting vomiting after taking Vimpat. She has appointment scheduled for Wednesday 12/20/17. Spoke with Dr. Sherri Koenig. He recommended to stop medication and will discuss options at follow up. Pt notified and voiced understanding.

## 2017-12-19 RX ORDER — MONTELUKAST SODIUM 10 MG/1
10 TABLET ORAL NIGHTLY
Qty: 30 TABLET | Refills: 11 | Status: SHIPPED | OUTPATIENT
Start: 2017-12-19 | End: 2019-01-04 | Stop reason: SDUPTHER

## 2017-12-19 RX ORDER — OLOPATADINE HYDROCHLORIDE 1 MG/ML
1 SOLUTION/ DROPS OPHTHALMIC 2 TIMES DAILY PRN
Qty: 5 ML | Refills: 11 | Status: SHIPPED | OUTPATIENT
Start: 2017-12-19 | End: 2019-01-04 | Stop reason: SDUPTHER

## 2017-12-20 ENCOUNTER — OFFICE VISIT (OUTPATIENT)
Dept: NEUROLOGY | Age: 27
End: 2017-12-20
Payer: COMMERCIAL

## 2017-12-20 VITALS
HEART RATE: 88 BPM | BODY MASS INDEX: 23.74 KG/M2 | HEIGHT: 63 IN | SYSTOLIC BLOOD PRESSURE: 118 MMHG | WEIGHT: 134 LBS | DIASTOLIC BLOOD PRESSURE: 72 MMHG

## 2017-12-20 DIAGNOSIS — R56.9 NEW ONSET SEIZURE (HCC): Primary | ICD-10-CM

## 2017-12-20 PROCEDURE — 99213 OFFICE O/P EST LOW 20 MIN: CPT | Performed by: PSYCHIATRY & NEUROLOGY

## 2017-12-20 RX ORDER — LAMOTRIGINE 100 MG/1
100 TABLET ORAL 2 TIMES DAILY
Qty: 60 TABLET | Refills: 2 | Status: SHIPPED | OUTPATIENT
Start: 2017-12-20 | End: 2018-03-12 | Stop reason: SDUPTHER

## 2017-12-20 RX ORDER — FOLIC ACID 1 MG/1
1 TABLET ORAL DAILY
Qty: 30 TABLET | Refills: 11 | Status: SHIPPED | OUTPATIENT
Start: 2017-12-20 | End: 2018-10-22 | Stop reason: SDUPTHER

## 2017-12-20 NOTE — PATIENT INSTRUCTIONS
1.  Change Lamictal to 100 mg twice a day for 10 days, then, 150 mg twice a day thereafter. 2. Continue with Folic acid 1 mg daily. 3. Repeat EEG in one week. 4. Report any new events. 5. No driving, swimming, operating heavy machinery or compromising heights until event free for 6 months. Report any new events. 6. Follow up in 3 months or sooner if needed. 7. Call if any questions or concerns.

## 2017-12-20 NOTE — PROGRESS NOTES
restricted diffusion or abnormal susceptibility is present. The ventricles are midline without evidence of hydrocephalus. No mass, mass    effect or extra-axial fluid collection is identified. The basal cisterns and visualized vascular flow voids are patent. No abnormal intracranial enhancement is present.       The bilateral hippocampal structures are symmetric. No abnormal signal alteration is identified. The fornices and mamillary bodies are also symmetric and within normal limits.       The visualized orbits and temporal bone structures are unremarkable. The paranasal sinuses are within normal limits.           Impression        Stable appearance of the brain without acute intracranial abnormality identified.         EEG 11/15/2017:  IMPRESSION:  This is an abnormal EEG due to the presence of epileptiform  discharges in the form of spike and wave and sharp wave activity  originating from the bifrontal areas periodically during the recording. No  clinical seizures were observed. Clinical correlation is highly  recommended. Assessment:    1. New onset seizure [facility])        She had a seizure on 12/12/2017. 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 Event lasted few min, with post ictal confusion for at least 20 min. No injury, neck was sore. All the muscles were sore. She went to ER and had testing and released. She did not tolerate Vimpat due to side effects of vomiting. She feels the Lamictal has helped with her mood, and had a calming effect. No headache, no rash reported. She was on 50 mg daily. She did not take the Vimpat past 4 dosages as she reports feeling side effects so she stopped the medications. After detailed discussion with patient we agreed on the following plan. Plan:  1. Change Lamictal to 100 mg twice a day for 10 days, then, 150 mg twice a day thereafter.    2. Continue with Folic acid 1 mg daily. 3. Repeat EEG in one week. 4. Stopped Vimpat due to side effects. 5. Report any new events. 6. No driving, swimming, operating heavy machinery or compromising heights until event free for 6 months. Report any new events. 7. Follow up in 3 months or sooner if needed. 8. Call if any questions or concerns. Please call if any questions.      Donavan Jensen MD

## 2018-01-30 ENCOUNTER — TELEPHONE (OUTPATIENT)
Dept: NEUROLOGY | Age: 28
End: 2018-01-30

## 2018-01-31 NOTE — TELEPHONE ENCOUNTER
Patient notified, she was scheduled to have EEG 1/5/18, but forgot to go.  Will reschedule EEG and follow up

## 2018-02-27 ENCOUNTER — TELEPHONE (OUTPATIENT)
Dept: FAMILY MEDICINE CLINIC | Age: 28
End: 2018-02-27

## 2018-02-27 DIAGNOSIS — E04.1 THYROID NODULE: Primary | ICD-10-CM

## 2018-02-28 NOTE — TELEPHONE ENCOUNTER
When patient returns call to the office Keegan Kuhn CNP has ordered a Thyroid US scheduled to follow her EEG tomorrow 3/1/18 arrive to main radiology at 12:30 pm.  No prep for US. Lab orders are filed at  for her to  and complete as well.

## 2018-03-01 ENCOUNTER — HOSPITAL ENCOUNTER (OUTPATIENT)
Dept: ULTRASOUND IMAGING | Age: 28
Discharge: HOME | End: 2018-03-01
Payer: COMMERCIAL

## 2018-03-01 ENCOUNTER — HOSPITAL ENCOUNTER (OUTPATIENT)
Dept: NEUROLOGY | Age: 28
Discharge: HOME | End: 2018-03-01
Payer: COMMERCIAL

## 2018-03-01 ENCOUNTER — TELEPHONE (OUTPATIENT)
Dept: FAMILY MEDICINE CLINIC | Age: 28
End: 2018-03-01

## 2018-03-01 DIAGNOSIS — E04.1 THYROID NODULE: ICD-10-CM

## 2018-03-01 PROCEDURE — 76536 US EXAM OF HEAD AND NECK: CPT

## 2018-03-01 PROCEDURE — 95819 EEG AWAKE AND ASLEEP: CPT

## 2018-03-05 ENCOUNTER — HOSPITAL ENCOUNTER (OUTPATIENT)
Age: 28
Discharge: HOME | End: 2018-03-05
Payer: COMMERCIAL

## 2018-03-05 DIAGNOSIS — E05.90 SUBCLINICAL HYPERTHYROIDISM: ICD-10-CM

## 2018-03-05 LAB
THYROXINE (T4): 7.5 UG/DL (ref 4.5–12)
TSH SERPL DL<=0.05 MIU/L-ACNC: 4 UIU/ML (ref 0.4–4.2)

## 2018-03-05 PROCEDURE — 36415 COLL VENOUS BLD VENIPUNCTURE: CPT

## 2018-03-05 PROCEDURE — 84436 ASSAY OF TOTAL THYROXINE: CPT

## 2018-03-05 PROCEDURE — 86376 MICROSOMAL ANTIBODY EACH: CPT

## 2018-03-05 PROCEDURE — 84443 ASSAY THYROID STIM HORMONE: CPT

## 2018-03-05 PROCEDURE — 86800 THYROGLOBULIN ANTIBODY: CPT

## 2018-03-07 DIAGNOSIS — E04.1 RIGHT THYROID NODULE: Primary | ICD-10-CM

## 2018-03-07 LAB
THYROGLOBULIN AB: < 0.9 IU/ML (ref 0–4)
THYROID PEROXIDASE ANTIBODY: < 0.3 IU/ML (ref 0–9)

## 2018-03-12 DIAGNOSIS — F41.0 PANIC ATTACK: Primary | ICD-10-CM

## 2018-03-12 RX ORDER — LAMOTRIGINE 100 MG/1
100 TABLET ORAL 2 TIMES DAILY
Qty: 60 TABLET | Refills: 2 | Status: SHIPPED | OUTPATIENT
Start: 2018-03-12 | End: 2018-03-15

## 2018-03-12 RX ORDER — LORAZEPAM 1 MG/1
1 TABLET ORAL EVERY 8 HOURS PRN
Qty: 30 TABLET | Refills: 1 | Status: SHIPPED | OUTPATIENT
Start: 2018-03-12 | End: 2018-04-11

## 2018-03-15 RX ORDER — LAMOTRIGINE 100 MG/1
TABLET ORAL
Qty: 60 TABLET | Refills: 2 | Status: SHIPPED | OUTPATIENT
Start: 2018-03-15 | End: 2018-05-09

## 2018-03-20 ENCOUNTER — HOSPITAL ENCOUNTER (OUTPATIENT)
Dept: ULTRASOUND IMAGING | Age: 28
Discharge: HOME | End: 2018-03-20
Payer: COMMERCIAL

## 2018-03-20 DIAGNOSIS — E04.1 RIGHT THYROID NODULE: ICD-10-CM

## 2018-03-20 PROCEDURE — 88173 CYTOPATH EVAL FNA REPORT: CPT

## 2018-03-20 PROCEDURE — 88172 CYTP DX EVAL FNA 1ST EA SITE: CPT

## 2018-03-20 PROCEDURE — 76942 ECHO GUIDE FOR BIOPSY: CPT

## 2018-05-09 ENCOUNTER — OFFICE VISIT (OUTPATIENT)
Dept: NEUROLOGY | Age: 28
End: 2018-05-09
Payer: COMMERCIAL

## 2018-05-09 VITALS
BODY MASS INDEX: 26.31 KG/M2 | HEIGHT: 62 IN | HEART RATE: 87 BPM | WEIGHT: 143 LBS | DIASTOLIC BLOOD PRESSURE: 64 MMHG | SYSTOLIC BLOOD PRESSURE: 110 MMHG

## 2018-05-09 DIAGNOSIS — G40.909 SEIZURE DISORDER (HCC): Primary | ICD-10-CM

## 2018-05-09 PROCEDURE — 99213 OFFICE O/P EST LOW 20 MIN: CPT | Performed by: PSYCHIATRY & NEUROLOGY

## 2018-05-09 RX ORDER — LAMOTRIGINE 150 MG/1
150 TABLET ORAL 2 TIMES DAILY
Qty: 60 TABLET | Refills: 5 | Status: SHIPPED | OUTPATIENT
Start: 2018-05-09 | End: 2018-09-14

## 2018-05-22 ENCOUNTER — OFFICE VISIT (OUTPATIENT)
Dept: FAMILY MEDICINE CLINIC | Age: 28
End: 2018-05-22
Payer: COMMERCIAL

## 2018-05-22 VITALS
HEIGHT: 62 IN | SYSTOLIC BLOOD PRESSURE: 118 MMHG | RESPIRATION RATE: 12 BRPM | WEIGHT: 141.7 LBS | BODY MASS INDEX: 26.07 KG/M2 | HEART RATE: 84 BPM | DIASTOLIC BLOOD PRESSURE: 74 MMHG

## 2018-05-22 DIAGNOSIS — R49.0 HOARSENESS OF VOICE: ICD-10-CM

## 2018-05-22 DIAGNOSIS — E04.1 THYROID NODULE: ICD-10-CM

## 2018-05-22 DIAGNOSIS — R56.9 NEW ONSET SEIZURE (HCC): ICD-10-CM

## 2018-05-22 DIAGNOSIS — G47.26 SHIFT WORK SLEEP DISORDER: ICD-10-CM

## 2018-05-22 DIAGNOSIS — F33.42 RECURRENT MAJOR DEPRESSIVE DISORDER, IN FULL REMISSION (HCC): Primary | ICD-10-CM

## 2018-05-22 DIAGNOSIS — F41.1 GAD (GENERALIZED ANXIETY DISORDER): ICD-10-CM

## 2018-05-22 PROCEDURE — 99214 OFFICE O/P EST MOD 30 MIN: CPT | Performed by: NURSE PRACTITIONER

## 2018-05-22 RX ORDER — ZOLPIDEM TARTRATE 10 MG/1
10 TABLET ORAL NIGHTLY PRN
Qty: 30 TABLET | Refills: 5 | Status: SHIPPED | OUTPATIENT
Start: 2018-05-22 | End: 2018-09-14

## 2018-05-22 RX ORDER — LORAZEPAM 1 MG/1
1 TABLET ORAL EVERY 8 HOURS PRN
Qty: 30 TABLET | Refills: 2 | Status: SHIPPED | OUTPATIENT
Start: 2018-05-22 | End: 2018-08-03 | Stop reason: SDUPTHER

## 2018-05-22 RX ORDER — LORAZEPAM 1 MG/1
1 TABLET ORAL EVERY 8 HOURS PRN
COMMUNITY
End: 2018-05-22 | Stop reason: SDUPTHER

## 2018-05-22 ASSESSMENT — ENCOUNTER SYMPTOMS
SHORTNESS OF BREATH: 0
ABDOMINAL PAIN: 0
COUGH: 0
VOICE CHANGE: 1
NAUSEA: 0

## 2018-06-07 ENCOUNTER — TELEPHONE (OUTPATIENT)
Dept: FAMILY MEDICINE CLINIC | Age: 28
End: 2018-06-07

## 2018-07-10 ENCOUNTER — APPOINTMENT (OUTPATIENT)
Dept: CT IMAGING | Age: 28
End: 2018-07-10
Payer: COMMERCIAL

## 2018-07-10 ENCOUNTER — HOSPITAL ENCOUNTER (EMERGENCY)
Age: 28
Discharge: HOME | End: 2018-07-11
Attending: FAMILY MEDICINE
Payer: COMMERCIAL

## 2018-07-10 ENCOUNTER — APPOINTMENT (OUTPATIENT)
Dept: GENERAL RADIOLOGY | Age: 28
End: 2018-07-10
Payer: COMMERCIAL

## 2018-07-10 DIAGNOSIS — G40.909 SEIZURE DISORDER (HCC): Primary | ICD-10-CM

## 2018-07-10 LAB
ALBUMIN SERPL-MCNC: 4.5 G/DL (ref 3.5–5.1)
ALP BLD-CCNC: 92 U/L (ref 38–126)
ALT SERPL-CCNC: 55 U/L (ref 11–66)
ANION GAP SERPL CALCULATED.3IONS-SCNC: 16 MEQ/L (ref 8–16)
AST SERPL-CCNC: 28 U/L (ref 5–40)
BACTERIA: ABNORMAL /HPF
BASOPHILS # BLD: 0.3 %
BASOPHILS ABSOLUTE: 0 THOU/MM3 (ref 0–0.1)
BILIRUB SERPL-MCNC: 0.4 MG/DL (ref 0.3–1.2)
BILIRUBIN URINE: NEGATIVE
BLOOD, URINE: ABNORMAL
BUN BLDV-MCNC: 9 MG/DL (ref 7–22)
CALCIUM SERPL-MCNC: 10.4 MG/DL (ref 8.5–10.5)
CASTS 2: ABNORMAL /LPF
CASTS UA: ABNORMAL /LPF
CHARACTER, URINE: CLEAR
CHLORIDE BLD-SCNC: 104 MEQ/L (ref 98–111)
CO2: 24 MEQ/L (ref 23–33)
COLOR: ABNORMAL
CREAT SERPL-MCNC: 0.6 MG/DL (ref 0.4–1.2)
CRYSTALS, UA: ABNORMAL
EOSINOPHIL # BLD: 0.5 %
EOSINOPHILS ABSOLUTE: 0 THOU/MM3 (ref 0–0.4)
EPITHELIAL CELLS, UA: ABNORMAL /HPF
ERYTHROCYTE [DISTWIDTH] IN BLOOD BY AUTOMATED COUNT: 13.2 % (ref 11.5–14.5)
ERYTHROCYTE [DISTWIDTH] IN BLOOD BY AUTOMATED COUNT: 40.3 FL (ref 35–45)
GFR SERPL CREATININE-BSD FRML MDRD: > 90 ML/MIN/1.73M2
GLUCOSE BLD-MCNC: 119 MG/DL (ref 70–108)
GLUCOSE URINE: NEGATIVE MG/DL
HCT VFR BLD CALC: 38.8 % (ref 37–47)
HEMOGLOBIN: 13 GM/DL (ref 12–16)
IMMATURE GRANS (ABS): 0.01 THOU/MM3 (ref 0–0.07)
IMMATURE GRANULOCYTES: 0.1 %
KETONES, URINE: NEGATIVE
LEUKOCYTE ESTERASE, URINE: NEGATIVE
LYMPHOCYTES # BLD: 12.7 %
LYMPHOCYTES ABSOLUTE: 1.1 THOU/MM3 (ref 1–4.8)
MAGNESIUM: 1.9 MG/DL (ref 1.6–2.4)
MCH RBC QN AUTO: 28.2 PG (ref 26–33)
MCHC RBC AUTO-ENTMCNC: 33.5 GM/DL (ref 32.2–35.5)
MCV RBC AUTO: 84.2 FL (ref 81–99)
MISCELLANEOUS 2: ABNORMAL
MONOCYTES # BLD: 4.9 %
MONOCYTES ABSOLUTE: 0.4 THOU/MM3 (ref 0.4–1.3)
NITRITE, URINE: NEGATIVE
NUCLEATED RED BLOOD CELLS: 0 /100 WBC
OSMOLALITY CALCULATION: 286.7 MOSMOL/KG (ref 275–300)
PH UA: 6.5
PLATELET # BLD: 412 THOU/MM3 (ref 130–400)
PMV BLD AUTO: 9.4 FL (ref 9.4–12.4)
POTASSIUM REFLEX MAGNESIUM: 3.4 MEQ/L (ref 3.5–5.2)
PROTEIN UA: NEGATIVE
RBC # BLD: 4.61 MILL/MM3 (ref 4.2–5.4)
RBC URINE: ABNORMAL /HPF
RENAL EPITHELIAL, UA: ABNORMAL
SEG NEUTROPHILS: 81.5 %
SEGMENTED NEUTROPHILS ABSOLUTE COUNT: 7.1 THOU/MM3 (ref 1.8–7.7)
SODIUM BLD-SCNC: 144 MEQ/L (ref 135–145)
SPECIFIC GRAVITY, URINE: 1.01 (ref 1–1.03)
TOTAL PROTEIN: 7.4 G/DL (ref 6.1–8)
TROPONIN T: < 0.01 NG/ML
UROBILINOGEN, URINE: 0.2 EU/DL
WBC # BLD: 8.7 THOU/MM3 (ref 4.8–10.8)
WBC UA: ABNORMAL /HPF
YEAST: ABNORMAL

## 2018-07-10 PROCEDURE — 71046 X-RAY EXAM CHEST 2 VIEWS: CPT

## 2018-07-10 PROCEDURE — 72125 CT NECK SPINE W/O DYE: CPT

## 2018-07-10 PROCEDURE — 2580000003 HC RX 258: Performed by: FAMILY MEDICINE

## 2018-07-10 PROCEDURE — 80053 COMPREHEN METABOLIC PANEL: CPT

## 2018-07-10 PROCEDURE — 80175 DRUG SCREEN QUAN LAMOTRIGINE: CPT

## 2018-07-10 PROCEDURE — 70450 CT HEAD/BRAIN W/O DYE: CPT

## 2018-07-10 PROCEDURE — 96374 THER/PROPH/DIAG INJ IV PUSH: CPT

## 2018-07-10 PROCEDURE — 83735 ASSAY OF MAGNESIUM: CPT

## 2018-07-10 PROCEDURE — 81001 URINALYSIS AUTO W/SCOPE: CPT

## 2018-07-10 PROCEDURE — 36415 COLL VENOUS BLD VENIPUNCTURE: CPT

## 2018-07-10 PROCEDURE — 85025 COMPLETE CBC W/AUTO DIFF WBC: CPT

## 2018-07-10 PROCEDURE — 84484 ASSAY OF TROPONIN QUANT: CPT

## 2018-07-10 PROCEDURE — 6360000002 HC RX W HCPCS: Performed by: FAMILY MEDICINE

## 2018-07-10 PROCEDURE — 99284 EMERGENCY DEPT VISIT MOD MDM: CPT

## 2018-07-10 PROCEDURE — 96375 TX/PRO/DX INJ NEW DRUG ADDON: CPT

## 2018-07-10 RX ORDER — KETOROLAC TROMETHAMINE 30 MG/ML
30 INJECTION, SOLUTION INTRAMUSCULAR; INTRAVENOUS ONCE
Status: COMPLETED | OUTPATIENT
Start: 2018-07-10 | End: 2018-07-10

## 2018-07-10 RX ORDER — LORAZEPAM 2 MG/ML
1 INJECTION INTRAMUSCULAR ONCE
Status: COMPLETED | OUTPATIENT
Start: 2018-07-10 | End: 2018-07-10

## 2018-07-10 RX ORDER — 0.9 % SODIUM CHLORIDE 0.9 %
1000 INTRAVENOUS SOLUTION INTRAVENOUS ONCE
Status: COMPLETED | OUTPATIENT
Start: 2018-07-10 | End: 2018-07-10

## 2018-07-10 RX ADMIN — KETOROLAC TROMETHAMINE 30 MG: 30 INJECTION, SOLUTION INTRAMUSCULAR at 22:36

## 2018-07-10 RX ADMIN — LORAZEPAM 1 MG: 2 INJECTION INTRAMUSCULAR; INTRAVENOUS at 22:43

## 2018-07-10 RX ADMIN — SODIUM CHLORIDE 1000 ML: 9 INJECTION, SOLUTION INTRAVENOUS at 22:34

## 2018-07-10 ASSESSMENT — ENCOUNTER SYMPTOMS
TROUBLE SWALLOWING: 0
SHORTNESS OF BREATH: 0
EYES NEGATIVE: 1
CHEST TIGHTNESS: 0
VOICE CHANGE: 0
PHOTOPHOBIA: 0
WHEEZING: 0
GASTROINTESTINAL NEGATIVE: 1
ABDOMINAL PAIN: 0
RESPIRATORY NEGATIVE: 1
VOMITING: 0
NAUSEA: 0

## 2018-07-10 ASSESSMENT — PAIN DESCRIPTION - LOCATION: LOCATION: HEAD;BACK

## 2018-07-10 ASSESSMENT — PAIN DESCRIPTION - DESCRIPTORS: DESCRIPTORS: SHARP;HEADACHE

## 2018-07-10 ASSESSMENT — PAIN SCALES - GENERAL
PAINLEVEL_OUTOF10: 5
PAINLEVEL_OUTOF10: 1
PAINLEVEL_OUTOF10: 5

## 2018-07-10 ASSESSMENT — PAIN DESCRIPTION - PAIN TYPE: TYPE: ACUTE PAIN

## 2018-07-10 ASSESSMENT — PAIN DESCRIPTION - PROGRESSION: CLINICAL_PROGRESSION: GRADUALLY IMPROVING

## 2018-07-11 VITALS
HEIGHT: 62 IN | OXYGEN SATURATION: 100 % | HEART RATE: 98 BPM | SYSTOLIC BLOOD PRESSURE: 112 MMHG | TEMPERATURE: 98.1 F | WEIGHT: 145 LBS | RESPIRATION RATE: 10 BRPM | BODY MASS INDEX: 26.68 KG/M2 | DIASTOLIC BLOOD PRESSURE: 77 MMHG

## 2018-07-11 ASSESSMENT — PAIN DESCRIPTION - ONSET: ONSET: ON-GOING

## 2018-07-11 ASSESSMENT — PAIN DESCRIPTION - FREQUENCY: FREQUENCY: CONTINUOUS

## 2018-07-11 ASSESSMENT — PAIN SCALES - GENERAL: PAINLEVEL_OUTOF10: 2

## 2018-07-11 NOTE — ED PROVIDER NOTES
[facility] [address] [address] EMERGENCY DEPT      CHIEF COMPLAINT       Chief Complaint   Patient presents with    Seizures     Seized about 1 minute witnessed by        Nurses Notes reviewed and I agree except as noted in the HPI. HISTORY OF PRESENT ILLNESS    Nestor Garvey is a 25 y.o. female who presents  To the emergency department for evaluation after a seizure. Patient said that in May of last year she had a generalized tonic-clonic seizures and then around November she had several more. She was admitted and at that admission and EEG was performed that confirmed that she has a generalized seizure. She follows with neurologist Dr Jean Pierre Lane who put her on lamictal and she states she has been compliant with the medication. Patient has done well because since December she has not had a seizure until now. Today she was in the bathroom when she developed generalized tonic-clonic seizures witnessed by her father that lasted just about a minute. Patient was brought in for further evaluation and treatment. By the time she got to the emergency department she was fully back to normal.  She has no injury at this time did not bite her tongue she did not have any incontinence of urine or stool. And no injury noted. She feels better now she denies headache neck pain or back pain no chest pain no abdominal symptoms no  symptoms she is currently on her period. She has no musculoskeletal or neurological deficits at this time. She otherwise looks well and clinically stable. REVIEW OF SYSTEMS     Review of Systems   Constitutional: Negative. Negative for chills and fever. HENT: Negative. Negative for congestion, trouble swallowing and voice change. Eyes: Negative. Negative for photophobia and visual disturbance. Respiratory: Negative. Negative for chest tightness, shortness of breath and wheezing. Cardiovascular: Negative. Negative for chest pain and leg swelling.    Gastrointestinal: rebound and no guarding. Musculoskeletal: Normal range of motion. She exhibits no edema or tenderness. Lymphadenopathy:     She has no cervical adenopathy. Neurological: She is alert and oriented to person, place, and time. She has normal strength. No cranial nerve deficit or sensory deficit. Coordination normal. GCS eye subscore is 4. GCS verbal subscore is 5. GCS motor subscore is 6. Skin: Skin is warm. No rash noted. She is not diaphoretic. No erythema. No pallor. DIFFERENTIAL DIAGNOSIS:     Recurrent seizures    DIAGNOSTIC RESULTS     EKG: All EKG's are interpreted by the Emergency Department Physician who either signs or Co-signs this chart in the absence of a cardiologist.      RADIOLOGY: non-plain film images(s) such as CT, Ultrasound and MRI are read by the radiologist.  Plain radiographic images are visualized and preliminarily interpreted by the emergency physician unless otherwise stated below. XR CHEST STANDARD (2 VW)   Final Result      No acute cardiopulmonary disease. **This report has been created using voice recognition software. It may contain minor errors which are inherent in voice recognition technology. **      Final report electronically signed by Dr. Chaudhry Peers on 7/10/2018 11:38 PM      CT Cervical Spine WO Contrast         CT Head WO Contrast             LABS:   Labs Reviewed   CBC WITH AUTO DIFFERENTIAL - Abnormal; Notable for the following:        Result Value    Platelets 052 (*)     All other components within normal limits   COMPREHENSIVE METABOLIC PANEL W/ REFLEX TO MG FOR LOW K - Abnormal; Notable for the following:     Glucose 119 (*)     Potassium reflex Magnesium 3.4 (*)     All other components within normal limits   URINE WITH REFLEXED MICRO - Abnormal; Notable for the following:     Blood, Urine MODERATE (*)     All other components within normal limits   TROPONIN   ANION GAP   GLOMERULAR FILTRATION RATE, ESTIMATED   MAGNESIUM   OSMOLALITY LAMOTRIGINE LEVEL       EMERGENCY DEPARTMENT COURSE:   Vitals:    Vitals:    07/10/18 2146 07/10/18 2238 07/10/18 2335   BP: (!) 141/96 (!) 123/92 100/67   Pulse: 106 95 95   Resp: 18 12 20   Temp: 98.1 °F (36.7 °C)     TempSrc: Oral     SpO2: 100% 100% 99%   Weight: 145 lb (65.8 kg)     Height: 5' 2\" (1.575 m)       MDM    Patient presented to the emergency department with recurrent seizure. Workup in the emergency department is negative she has remained stable. She can be discharged for follow-up as an outpatient. Workup is otherwise unremarkable including negative CT head and neck. CBC and chemistries are all normal.  Patient is acceptable with the plan and she is discharged she is asked to return to the emergency room promptly if she develops further symptoms or she gets recurrent seizure. She is asked to call her neurologist for further evaluation and treatment and adjustment of medications to better control her seizure disorder. FINAL IMPRESSION      1. Seizure disorder [facility])          DISPOSITION/PLAN     DISPOSITION Decision To DischargePatient is discharged.       PATIENT REFERRED TO:  [facility], APRN - [facility]  [address] [facility], [address] [place] [address] [address] [facility]  [phone]    Schedule an appointment as soon as possible for a visit in 1 day      [facility] EMERGENCY DEPT  [address] [place] [address]  [phone]    If symptoms worsen at any time      DISCHARGE MEDICATIONS:  New Prescriptions    No medications on file       (Please note that portions of this note were completed with a voice recognition program.  Efforts were made to edit the dictations but occasionally words are mis-transcribed.)    MD Reji Tamayo MD  07/11/18 0163

## 2018-07-12 ENCOUNTER — CARE COORDINATION (OUTPATIENT)
Dept: CASE MANAGEMENT | Age: 28
End: 2018-07-12

## 2018-07-12 LAB — LAMOTRIGINE LEVEL: 2.5 UG/ML (ref 2.5–15)

## 2018-07-12 NOTE — CARE COORDINATION
Attempted to reach patient for ED follow up. Mailbox is full and cannot accept any messages at this time.

## 2018-07-28 ENCOUNTER — HOSPITAL ENCOUNTER (EMERGENCY)
Age: 28
Discharge: HOME | End: 2018-07-28
Payer: COMMERCIAL

## 2018-07-28 VITALS
TEMPERATURE: 97.9 F | RESPIRATION RATE: 16 BRPM | WEIGHT: 145 LBS | BODY MASS INDEX: 26.68 KG/M2 | DIASTOLIC BLOOD PRESSURE: 91 MMHG | SYSTOLIC BLOOD PRESSURE: 116 MMHG | OXYGEN SATURATION: 100 % | HEIGHT: 62 IN | HEART RATE: 100 BPM

## 2018-07-28 DIAGNOSIS — L25.9 CONTACT DERMATITIS, UNSPECIFIED CONTACT DERMATITIS TYPE, UNSPECIFIED TRIGGER: Primary | ICD-10-CM

## 2018-07-28 PROCEDURE — 99282 EMERGENCY DEPT VISIT SF MDM: CPT

## 2018-07-28 PROCEDURE — 6370000000 HC RX 637 (ALT 250 FOR IP): Performed by: PHYSICIAN ASSISTANT

## 2018-07-28 RX ORDER — PREDNISONE 20 MG/1
TABLET ORAL
Qty: 15 TABLET | Refills: 0 | Status: SHIPPED | OUTPATIENT
Start: 2018-07-28 | End: 2018-08-07

## 2018-07-28 RX ORDER — HYDROXYZINE 50 MG/1
50 TABLET, FILM COATED ORAL EVERY 8 HOURS PRN
Qty: 20 TABLET | Refills: 0 | Status: SHIPPED | OUTPATIENT
Start: 2018-07-28 | End: 2019-01-04 | Stop reason: ALTCHOICE

## 2018-07-28 RX ORDER — PREDNISONE 20 MG/1
60 TABLET ORAL ONCE
Status: COMPLETED | OUTPATIENT
Start: 2018-07-28 | End: 2018-07-28

## 2018-07-28 RX ORDER — TRIAMCINOLONE ACETONIDE 1 MG/G
CREAM TOPICAL
Qty: 1 TUBE | Refills: 0 | Status: SHIPPED | OUTPATIENT
Start: 2018-07-28 | End: 2019-01-04 | Stop reason: ALTCHOICE

## 2018-07-28 RX ADMIN — PREDNISONE 60 MG: 20 TABLET ORAL at 08:35

## 2018-07-28 ASSESSMENT — ENCOUNTER SYMPTOMS
WHEEZING: 0
EYE PAIN: 0
DIARRHEA: 0
RHINORRHEA: 0
NAUSEA: 0
ABDOMINAL PAIN: 0
EYE DISCHARGE: 0
BACK PAIN: 0
SORE THROAT: 0
VOMITING: 0
COUGH: 0
SHORTNESS OF BREATH: 0

## 2018-07-28 ASSESSMENT — PAIN DESCRIPTION - DESCRIPTORS: DESCRIPTORS: BURNING;ACHING

## 2018-07-28 ASSESSMENT — PAIN DESCRIPTION - LOCATION: LOCATION: HAND

## 2018-07-28 ASSESSMENT — PAIN DESCRIPTION - PAIN TYPE: TYPE: ACUTE PAIN

## 2018-07-28 ASSESSMENT — PAIN SCALES - GENERAL: PAINLEVEL_OUTOF10: 7

## 2018-07-28 ASSESSMENT — PAIN DESCRIPTION - ORIENTATION: ORIENTATION: LEFT

## 2018-07-28 NOTE — ED PROVIDER NOTES
includes Breast lumpectomy (2009); Hickman tooth extraction (2009); Mouth surgery (2009); Tonsillectomy; Adenoidectomy; LEEP; and Breast biopsy. CURRENT MEDICATIONS       Discharge Medication List as of 7/28/2018  8:24 AM      CONTINUE these medications which have NOT CHANGED    Details   zolpidem (AMBIEN) 10 MG tablet Take 1 tablet by mouth nightly as needed for Sleep for up to 180 days. ., Disp-30 tablet, R-5Normal      LORazepam (ATIVAN) 1 MG tablet Take 1 tablet by mouth every 8 hours as needed for Anxiety for up to 150 days. ., Disp-30 tablet, R-2Normal      lamoTRIgine (LAMICTAL) 150 MG tablet Take 1 tablet by mouth 2 times daily, Disp-60 tablet, R-5Normal      metoprolol tartrate (LOPRESSOR) 25 MG tablet TAKE ONE TABLET BY MOUTH TWICE A DAY, Disp-60 tablet, [id number]      folic acid (FOLVITE) 1 MG tablet Take 1 tablet by mouth daily, Disp-30 tablet, R-11Normal      olopatadine (PATANOL) 0.1 % ophthalmic solution Place 1 drop into both eyes 2 times daily as needed for Allergies, Disp-5 mL, R-11Normal      montelukast (SINGULAIR) 10 MG tablet Take 1 tablet by mouth nightly, Disp-30 tablet, R-11Normal      desvenlafaxine succinate (PRISTIQ) 50 MG TB24 extended release tablet Take 1 tablet by mouth daily, Disp-30 tablet, R-11Normal      fluticasone (FLONASE) 50 MCG/ACT nasal spray SHAKE LIQUID AND USE 2 SPRAYS BY NASAL ROUTE DAILY AS NEEDED FOR ALLERGIES, Disp-16 g, R-11Normal             ALLERGIES     is allergic to amoxicillin; avelox [moxifloxacin]; cleocin [clindamycin hcl]; levaquin [levofloxacin]; flexeril [cyclobenzaprine]; influenza vaccines; nsaids; tramadol; and vimpat [lacosamide]. FAMILY HISTORY     indicated that her mother is alive. She indicated that her father is alive. She indicated that the status of her maternal grandmother is unknown. She indicated that the status of her maternal grandfather is unknown. She indicated that the status of her paternal grandmother is unknown.  She indicated vitals reviewed. DIFFERENTIAL DIAGNOSIS:   Contact dermatitis, Cellulitis, Urticaria. DIAGNOSTIC RESULTS     EKG: All EKG's are interpreted by the Emergency Department Physician who either signs or Co-signs this chart in the absence of a cardiologist.  None    RADIOLOGY: non-plain film images(s) such as CT, Ultrasound and MRI are read by the radiologist.  No orders to display        LABS:   Labs Reviewed - No data to display    EMERGENCY DEPARTMENT COURSE:   Vitals:    Vitals:    07/28/18 0813   BP: (!) 116/91   Pulse: 100   Resp: 16   Temp: 97.9 °F (36.6 °C)   TempSrc: Oral   SpO2: 100%   Weight: 145 lb (65.8 kg)   Height: 5' 2\" (1.575 m)       8:38 AM: The patient was seen and evaluated. The pt was seen for a rash. On examination, the rash is consistent with contact dermatitis. Prednisone was given in the ED. The patient is discharged in stable condition with hydroxyzine, prednisone, and Kenalog. She is instructed to follow-up with her PCP. The patient was discharged home, and she will return to the ED if her symptoms become more severe in nature, or otherwise change. CRITICAL CARE:   None     CONSULTS:  None    PROCEDURES:  None    FINAL IMPRESSION      1.  Contact dermatitis, unspecified contact dermatitis type, unspecified trigger          DISPOSITION/PLAN   Discharge    PATIENT REFERRED TO:  PADMINI Barber - CNP  [address] [place] [address]  [facility]  [phone]    In 1 week        DISCHARGE MEDICATIONS:  Discharge Medication List as of 7/28/2018  8:24 AM      START taking these medications    Details   predniSONE (DELTASONE) 20 MG tablet Take 3 tablets by mouth once daily for 5 days, Disp-15 tablet, R-0Print      hydrOXYzine (ATARAX) 50 MG tablet Take 1 tablet by mouth every 8 hours as needed for Itching, Disp-20 tablet, R-0Print      triamcinolone (KENALOG) 0.1 % cream Apply topically 2 times daily for 1 week., Disp-1 Tube, R-0, Print             (Please note that portions of this note were completed with a voice recognition program.  Efforts were made to edit the dictations but occasionally words are mis-transcribed.)    Scribe:  Shanon Mcfadden 07/28/18 8:38 AM Scribing for and in the presence of Juanna Schaumann, PA-C    Signed by: Priscila Ramires, 7/28/18 9:11 AM    Provider:  I personally performed the services described in the documentation, reviewed and edited the documentation which was dictated to the scribe in my presence, and it accurately records my words and actions.     Juanna Schaumann, PA-C 07/28/18 9:11 AM       Juanna Schaumann, PA  07/28/18 4423

## 2018-07-31 NOTE — CARE COORDINATION
Attempted to reach patient for ED follow up call. Unable to leave voicemail as mailbox is full and cannot accept messages.

## 2018-08-03 ENCOUNTER — TELEPHONE (OUTPATIENT)
Dept: FAMILY MEDICINE CLINIC | Age: 28
End: 2018-08-03

## 2018-08-03 ENCOUNTER — TELEPHONE (OUTPATIENT)
Dept: NEUROLOGY | Age: 28
End: 2018-08-03

## 2018-08-03 DIAGNOSIS — F41.1 GAD (GENERALIZED ANXIETY DISORDER): ICD-10-CM

## 2018-08-03 DIAGNOSIS — G40.909 SEIZURE DISORDER (HCC): Primary | ICD-10-CM

## 2018-08-03 RX ORDER — LORAZEPAM 1 MG/1
1 TABLET ORAL EVERY 8 HOURS PRN
Qty: 30 TABLET | Refills: 2 | Status: SHIPPED | OUTPATIENT
Start: 2018-08-03 | End: 2019-01-01 | Stop reason: SDUPTHER

## 2018-08-03 NOTE — TELEPHONE ENCOUNTER
Patient called in requesting referral to [facility] for seizures. Per Dr. Cristhian Maria, ok to place referral. Patient notified and verbalized understanding.

## 2018-08-03 NOTE — TELEPHONE ENCOUNTER
Patient states this is a FYI, she has been DX w/Epilepsy again as on 7/11/18 she went to ER for this cause she fell. This is FYI.

## 2018-08-03 NOTE — TELEPHONE ENCOUNTER
Angel Laguna called requesting a refill on the following medications:  Requested Prescriptions     Pending Prescriptions Disp Refills    LORazepam (ATIVAN) 1 MG tablet 30 tablet 2     Sig: Take 1 tablet by mouth every 8 hours as needed for Anxiety for up to 150 days. .     Pharmacy verified:  [email or web address]      Date of last visit: 5/22/18  Date of next visit (if applicable): 24/59/5263

## 2018-09-14 ENCOUNTER — TELEPHONE (OUTPATIENT)
Dept: NEUROLOGY | Age: 28
End: 2018-09-14

## 2018-09-14 RX ORDER — ZONISAMIDE 100 MG/1
200 CAPSULE ORAL DAILY
COMMUNITY
End: 2019-01-01 | Stop reason: SINTOL

## 2018-09-14 RX ORDER — CLONAZEPAM 0.5 MG/1
0.5 TABLET ORAL 2 TIMES DAILY
COMMUNITY
End: 2019-01-01 | Stop reason: ALTCHOICE

## 2018-10-22 RX ORDER — FOLIC ACID 1 MG/1
TABLET ORAL
Qty: 30 TABLET | Refills: 11 | Status: SHIPPED | OUTPATIENT
Start: 2018-10-22

## 2018-11-05 DIAGNOSIS — F41.1 GAD (GENERALIZED ANXIETY DISORDER): ICD-10-CM

## 2018-11-05 DIAGNOSIS — G47.26 SHIFT WORK SLEEP DISORDER: ICD-10-CM

## 2018-11-05 RX ORDER — ZOLPIDEM TARTRATE 10 MG/1
10 TABLET ORAL NIGHTLY PRN
Qty: 30 TABLET | Refills: 5 | Status: SHIPPED | OUTPATIENT
Start: 2018-11-05 | End: 2019-01-01 | Stop reason: SDUPTHER

## 2018-11-05 RX ORDER — LORAZEPAM 1 MG/1
1 TABLET ORAL EVERY 8 HOURS PRN
Qty: 30 TABLET | Refills: 2 | OUTPATIENT
Start: 2018-11-05 | End: 2019-04-04

## 2019-01-01 ENCOUNTER — TELEPHONE (OUTPATIENT)
Dept: FAMILY MEDICINE CLINIC | Age: 29
End: 2019-01-01

## 2019-01-01 ENCOUNTER — APPOINTMENT (OUTPATIENT)
Dept: ULTRASOUND IMAGING | Age: 29
End: 2019-01-01
Payer: COMMERCIAL

## 2019-01-01 ENCOUNTER — CARE COORDINATION (OUTPATIENT)
Dept: OTHER | Facility: CLINIC | Age: 29
End: 2019-01-01

## 2019-01-01 ENCOUNTER — OFFICE VISIT (OUTPATIENT)
Dept: FAMILY MEDICINE CLINIC | Age: 29
End: 2019-01-01
Payer: COMMERCIAL

## 2019-01-01 ENCOUNTER — HOSPITAL ENCOUNTER (EMERGENCY)
Age: 29
Discharge: HOME | End: 2019-09-10
Attending: EMERGENCY MEDICINE
Payer: COMMERCIAL

## 2019-01-01 VITALS
SYSTOLIC BLOOD PRESSURE: 116 MMHG | WEIGHT: 157.2 LBS | DIASTOLIC BLOOD PRESSURE: 70 MMHG | RESPIRATION RATE: 16 BRPM | TEMPERATURE: 97.9 F | HEIGHT: 62 IN | HEART RATE: 80 BPM | BODY MASS INDEX: 28.93 KG/M2

## 2019-01-01 VITALS
WEIGHT: 152.7 LBS | DIASTOLIC BLOOD PRESSURE: 84 MMHG | RESPIRATION RATE: 16 BRPM | BODY MASS INDEX: 27.93 KG/M2 | SYSTOLIC BLOOD PRESSURE: 122 MMHG | HEART RATE: 68 BPM

## 2019-01-01 VITALS
DIASTOLIC BLOOD PRESSURE: 82 MMHG | HEART RATE: 72 BPM | RESPIRATION RATE: 16 BRPM | BODY MASS INDEX: 27.23 KG/M2 | WEIGHT: 148.9 LBS | SYSTOLIC BLOOD PRESSURE: 136 MMHG

## 2019-01-01 VITALS
HEART RATE: 70 BPM | WEIGHT: 148 LBS | BODY MASS INDEX: 27.23 KG/M2 | OXYGEN SATURATION: 100 % | DIASTOLIC BLOOD PRESSURE: 90 MMHG | TEMPERATURE: 97.8 F | SYSTOLIC BLOOD PRESSURE: 115 MMHG | HEIGHT: 62 IN | RESPIRATION RATE: 16 BRPM

## 2019-01-01 DIAGNOSIS — J30.2 SEASONAL ALLERGIC RHINITIS, UNSPECIFIED TRIGGER: ICD-10-CM

## 2019-01-01 DIAGNOSIS — R10.2 PELVIC PAIN: Primary | ICD-10-CM

## 2019-01-01 DIAGNOSIS — F33.42 RECURRENT MAJOR DEPRESSIVE DISORDER, IN FULL REMISSION (HCC): ICD-10-CM

## 2019-01-01 DIAGNOSIS — F41.0 PANIC ATTACK: ICD-10-CM

## 2019-01-01 DIAGNOSIS — G40.909 SEIZURE DISORDER (HCC): ICD-10-CM

## 2019-01-01 DIAGNOSIS — F41.1 GAD (GENERALIZED ANXIETY DISORDER): ICD-10-CM

## 2019-01-01 DIAGNOSIS — G47.26 SHIFT WORK SLEEP DISORDER: ICD-10-CM

## 2019-01-01 DIAGNOSIS — N30.01 ACUTE CYSTITIS WITH HEMATURIA: Primary | ICD-10-CM

## 2019-01-01 DIAGNOSIS — R59.0 LYMPHADENOPATHY, CERVICAL: ICD-10-CM

## 2019-01-01 DIAGNOSIS — J02.9 ACUTE PHARYNGITIS, UNSPECIFIED ETIOLOGY: Primary | ICD-10-CM

## 2019-01-01 DIAGNOSIS — R10.9 ABDOMINAL PAIN, UNSPECIFIED ABDOMINAL LOCATION: ICD-10-CM

## 2019-01-01 DIAGNOSIS — F43.10 PTSD (POST-TRAUMATIC STRESS DISORDER): ICD-10-CM

## 2019-01-01 DIAGNOSIS — F41.1 GAD (GENERALIZED ANXIETY DISORDER): Primary | ICD-10-CM

## 2019-01-01 LAB
AMORPHOUS: ABNORMAL
ANION GAP SERPL CALCULATED.3IONS-SCNC: 17 MEQ/L (ref 8–16)
BACTERIA: ABNORMAL /HPF
BASOPHILS # BLD: 0.5 %
BASOPHILS ABSOLUTE: 0 THOU/MM3 (ref 0–0.1)
BILIRUBIN URINE: NEGATIVE
BLOOD, URINE: ABNORMAL
BUN BLDV-MCNC: 7 MG/DL (ref 7–22)
CALCIUM SERPL-MCNC: 9.9 MG/DL (ref 8.5–10.5)
CASTS UA: ABNORMAL /LPF
CHARACTER, URINE: ABNORMAL
CHLAMYDIA TRACHOMATIS BY RT-PCR: NOT DETECTED
CHLORIDE BLD-SCNC: 99 MEQ/L (ref 98–111)
CO2: 25 MEQ/L (ref 23–33)
COLOR: YELLOW
CREAT SERPL-MCNC: 0.8 MG/DL (ref 0.4–1.2)
CRYSTALS, UA: ABNORMAL
CT/NG SOURCE: NORMAL
EOSINOPHIL # BLD: 3 %
EOSINOPHILS ABSOLUTE: 0.3 THOU/MM3 (ref 0–0.4)
EPITHELIAL CELLS, UA: ABNORMAL /HPF
ERYTHROCYTE [DISTWIDTH] IN BLOOD BY AUTOMATED COUNT: 12.6 % (ref 11.5–14.5)
ERYTHROCYTE [DISTWIDTH] IN BLOOD BY AUTOMATED COUNT: 41.5 FL (ref 35–45)
GENITAL CULTURE, ROUTINE: NORMAL
GFR SERPL CREATININE-BSD FRML MDRD: 83 ML/MIN/1.73M2
GLUCOSE BLD-MCNC: 89 MG/DL (ref 70–108)
GLUCOSE URINE: NEGATIVE MG/DL
GRAM STAIN RESULT: NORMAL
HCT VFR BLD CALC: 39.3 % (ref 37–47)
HEMOGLOBIN: 12.6 GM/DL (ref 12–16)
HIV-2 AB: NEGATIVE
IMMATURE GRANS (ABS): 0.01 THOU/MM3 (ref 0–0.07)
IMMATURE GRANULOCYTES: 0 %
KETONES, URINE: NEGATIVE
KOH PREP: NORMAL
LEUKOCYTE ESTERASE, URINE: ABNORMAL
LYMPHOCYTES # BLD: 31.9 %
LYMPHOCYTES ABSOLUTE: 2.8 THOU/MM3 (ref 1–4.8)
MAGNESIUM: 1.7 MG/DL (ref 1.6–2.4)
MCH RBC QN AUTO: 29 PG (ref 26–33)
MCHC RBC AUTO-ENTMCNC: 32.1 GM/DL (ref 32.2–35.5)
MCV RBC AUTO: 90.3 FL (ref 81–99)
MONOCYTES # BLD: 9.9 %
MONOCYTES ABSOLUTE: 0.9 THOU/MM3 (ref 0.4–1.3)
NEISSERIA GONORRHOEAE BY RT-PCR: NOT DETECTED
NITRITE, URINE: POSITIVE
NUCLEATED RED BLOOD CELLS: 0 /100 WBC
ORGANISM: ABNORMAL
OSMOLALITY CALCULATION: 278.7 MOSMOL/KG (ref 275–300)
PH UA: 6 (ref 5–9)
PLATELET # BLD: 376 THOU/MM3 (ref 130–400)
PMV BLD AUTO: 9.5 FL (ref 9.4–12.4)
POTASSIUM REFLEX MAGNESIUM: 3.4 MEQ/L (ref 3.5–5.2)
PREGNANCY, SERUM: NEGATIVE
PROTEIN UA: ABNORMAL
RBC # BLD: 4.35 MILL/MM3 (ref 4.2–5.4)
RBC URINE: > 100 /HPF
RENAL EPITHELIAL, UA: ABNORMAL
SEG NEUTROPHILS: 54.6 %
SEGMENTED NEUTROPHILS ABSOLUTE COUNT: 4.8 THOU/MM3 (ref 1.8–7.7)
SODIUM BLD-SCNC: 141 MEQ/L (ref 135–145)
SPECIFIC GRAVITY, URINE: 1.01 (ref 1–1.03)
TRICHOMONAS PREP: NORMAL
URINE CULTURE REFLEX: ABNORMAL
UROBILINOGEN, URINE: 0.2 EU/DL (ref 0–1)
WBC # BLD: 8.7 THOU/MM3 (ref 4.8–10.8)
WBC UA: ABNORMAL /HPF
YEAST: ABNORMAL

## 2019-01-01 PROCEDURE — 87205 SMEAR GRAM STAIN: CPT

## 2019-01-01 PROCEDURE — 6360000002 HC RX W HCPCS: Performed by: EMERGENCY MEDICINE

## 2019-01-01 PROCEDURE — 87220 TISSUE EXAM FOR FUNGI: CPT

## 2019-01-01 PROCEDURE — 87591 N.GONORRHOEAE DNA AMP PROB: CPT

## 2019-01-01 PROCEDURE — 83735 ASSAY OF MAGNESIUM: CPT

## 2019-01-01 PROCEDURE — 76830 TRANSVAGINAL US NON-OB: CPT

## 2019-01-01 PROCEDURE — 84703 CHORIONIC GONADOTROPIN ASSAY: CPT

## 2019-01-01 PROCEDURE — 87070 CULTURE OTHR SPECIMN AEROBIC: CPT

## 2019-01-01 PROCEDURE — 36415 COLL VENOUS BLD VENIPUNCTURE: CPT

## 2019-01-01 PROCEDURE — 81001 URINALYSIS AUTO W/SCOPE: CPT

## 2019-01-01 PROCEDURE — 99213 OFFICE O/P EST LOW 20 MIN: CPT | Performed by: NURSE PRACTITIONER

## 2019-01-01 PROCEDURE — 85025 COMPLETE CBC W/AUTO DIFF WBC: CPT

## 2019-01-01 PROCEDURE — 87077 CULTURE AEROBIC IDENTIFY: CPT

## 2019-01-01 PROCEDURE — 6370000000 HC RX 637 (ALT 250 FOR IP): Performed by: EMERGENCY MEDICINE

## 2019-01-01 PROCEDURE — 99284 EMERGENCY DEPT VISIT MOD MDM: CPT

## 2019-01-01 PROCEDURE — 87086 URINE CULTURE/COLONY COUNT: CPT

## 2019-01-01 PROCEDURE — 96372 THER/PROPH/DIAG INJ SC/IM: CPT

## 2019-01-01 PROCEDURE — 2500000003 HC RX 250 WO HCPCS: Performed by: EMERGENCY MEDICINE

## 2019-01-01 PROCEDURE — 87389 HIV-1 AG W/HIV-1&-2 AB AG IA: CPT

## 2019-01-01 PROCEDURE — 2709999900 HC NON-CHARGEABLE SUPPLY

## 2019-01-01 PROCEDURE — 87186 SC STD MICRODIL/AGAR DIL: CPT

## 2019-01-01 PROCEDURE — 96374 THER/PROPH/DIAG INJ IV PUSH: CPT

## 2019-01-01 PROCEDURE — 87210 SMEAR WET MOUNT SALINE/INK: CPT

## 2019-01-01 PROCEDURE — 80048 BASIC METABOLIC PNL TOTAL CA: CPT

## 2019-01-01 PROCEDURE — 87491 CHLMYD TRACH DNA AMP PROBE: CPT

## 2019-01-01 RX ORDER — DIPHENOXYLATE HYDROCHLORIDE AND ATROPINE SULFATE 2.5; .025 MG/1; MG/1
TABLET ORAL
COMMUNITY

## 2019-01-01 RX ORDER — METOPROLOL TARTRATE 50 MG/1
TABLET, FILM COATED ORAL
Qty: 180 TABLET | Refills: 3 | Status: SHIPPED | OUTPATIENT
Start: 2019-01-01 | End: 2020-01-01 | Stop reason: SDUPTHER

## 2019-01-01 RX ORDER — OLOPATADINE HYDROCHLORIDE 1 MG/ML
SOLUTION/ DROPS OPHTHALMIC
Qty: 5 ML | Refills: 11 | Status: SHIPPED | OUTPATIENT
Start: 2019-01-01 | End: 2020-01-01 | Stop reason: SDUPTHER

## 2019-01-01 RX ORDER — KETOROLAC TROMETHAMINE 30 MG/ML
30 INJECTION, SOLUTION INTRAMUSCULAR; INTRAVENOUS ONCE
Status: COMPLETED | OUTPATIENT
Start: 2019-01-01 | End: 2019-01-01

## 2019-01-01 RX ORDER — METRONIDAZOLE 500 MG/1
500 TABLET ORAL 2 TIMES DAILY
Qty: 20 TABLET | Refills: 0 | Status: SHIPPED | OUTPATIENT
Start: 2019-01-01 | End: 2019-01-01

## 2019-01-01 RX ORDER — ZOLPIDEM TARTRATE 10 MG/1
10 TABLET ORAL NIGHTLY PRN
Qty: 30 TABLET | Refills: 5 | Status: SHIPPED | OUTPATIENT
Start: 2019-01-01 | End: 2019-01-01 | Stop reason: SDUPTHER

## 2019-01-01 RX ORDER — LORAZEPAM 0.5 MG/1
0.5 TABLET ORAL EVERY 6 HOURS PRN
Qty: 60 TABLET | Refills: 0 | Status: SHIPPED | OUTPATIENT
Start: 2019-01-01 | End: 2019-01-01 | Stop reason: SDUPTHER

## 2019-01-01 RX ORDER — LORAZEPAM 1 MG/1
1 TABLET ORAL EVERY 8 HOURS PRN
Qty: 30 TABLET | Refills: 2 | Status: SHIPPED | OUTPATIENT
Start: 2019-01-01 | End: 2019-01-01 | Stop reason: SDUPTHER

## 2019-01-01 RX ORDER — PROMETHAZINE HYDROCHLORIDE 25 MG/1
25 TABLET ORAL 3 TIMES DAILY PRN
Qty: 12 TABLET | Refills: 0 | Status: SHIPPED | OUTPATIENT
Start: 2019-01-01 | End: 2019-01-01

## 2019-01-01 RX ORDER — HYDROCODONE BITARTRATE AND ACETAMINOPHEN 5; 325 MG/1; MG/1
1 TABLET ORAL ONCE
Status: COMPLETED | OUTPATIENT
Start: 2019-01-01 | End: 2019-01-01

## 2019-01-01 RX ORDER — CEFDINIR 300 MG/1
300 CAPSULE ORAL 2 TIMES DAILY
Qty: 20 CAPSULE | Refills: 0 | Status: SHIPPED | OUTPATIENT
Start: 2019-01-01 | End: 2019-01-01

## 2019-01-01 RX ORDER — LORAZEPAM 0.5 MG/1
0.5 TABLET ORAL EVERY 6 HOURS PRN
Qty: 60 TABLET | Refills: 0 | Status: CANCELLED | OUTPATIENT
Start: 2019-01-01 | End: 2019-01-01

## 2019-01-01 RX ORDER — ZOLPIDEM TARTRATE 10 MG/1
TABLET ORAL
Qty: 30 TABLET | Refills: 5 | Status: SHIPPED | OUTPATIENT
Start: 2019-01-01 | End: 2020-01-01 | Stop reason: SDUPTHER

## 2019-01-01 RX ORDER — DESVENLAFAXINE 50 MG/1
50 TABLET, EXTENDED RELEASE ORAL
COMMUNITY
Start: 2017-11-22 | End: 2019-01-01

## 2019-01-01 RX ORDER — LORAZEPAM 0.5 MG/1
0.5 TABLET ORAL EVERY 6 HOURS PRN
Qty: 60 TABLET | Refills: 2 | Status: SHIPPED | OUTPATIENT
Start: 2019-01-01 | End: 2019-01-01

## 2019-01-01 RX ORDER — CEFTRIAXONE 1 G/1
1 INJECTION, POWDER, FOR SOLUTION INTRAMUSCULAR; INTRAVENOUS ONCE
Status: COMPLETED | OUTPATIENT
Start: 2019-01-01 | End: 2019-01-01

## 2019-01-01 RX ORDER — QUETIAPINE FUMARATE 50 MG/1
50 TABLET, EXTENDED RELEASE ORAL NIGHTLY
Qty: 30 TABLET | Refills: 1 | Status: SHIPPED | OUTPATIENT
Start: 2019-01-01 | End: 2020-01-01 | Stop reason: SINTOL

## 2019-01-01 RX ORDER — DOXYCYCLINE HYCLATE 100 MG
100 TABLET ORAL ONCE
Status: COMPLETED | OUTPATIENT
Start: 2019-01-01 | End: 2019-01-01

## 2019-01-01 RX ORDER — CEFDINIR 300 MG/1
300 CAPSULE ORAL 2 TIMES DAILY
Qty: 14 CAPSULE | Refills: 0 | Status: SHIPPED | OUTPATIENT
Start: 2019-01-01 | End: 2019-01-01

## 2019-01-01 RX ORDER — METRONIDAZOLE 500 MG/1
500 TABLET ORAL ONCE
Status: COMPLETED | OUTPATIENT
Start: 2019-01-01 | End: 2019-01-01

## 2019-01-01 RX ORDER — LIDOCAINE HYDROCHLORIDE 10 MG/ML
2.1 INJECTION, SOLUTION EPIDURAL; INFILTRATION; INTRACAUDAL; PERINEURAL ONCE
Status: COMPLETED | OUTPATIENT
Start: 2019-01-01 | End: 2019-01-01

## 2019-01-01 RX ORDER — DESVENLAFAXINE 50 MG/1
50 TABLET, EXTENDED RELEASE ORAL DAILY
Qty: 30 TABLET | Refills: 1 | Status: SHIPPED | OUTPATIENT
Start: 2019-01-01 | End: 2019-01-01 | Stop reason: SDUPTHER

## 2019-01-01 RX ORDER — DESVENLAFAXINE 50 MG/1
TABLET, EXTENDED RELEASE ORAL
Qty: 30 TABLET | Refills: 11 | Status: SHIPPED | OUTPATIENT
Start: 2019-01-01 | End: 2020-01-01

## 2019-01-01 RX ORDER — LORAZEPAM 1 MG/1
TABLET ORAL
Qty: 60 TABLET | Refills: 2 | Status: SHIPPED | OUTPATIENT
Start: 2019-01-01 | End: 2020-01-01

## 2019-01-01 RX ADMIN — KETOROLAC TROMETHAMINE 30 MG: 30 INJECTION, SOLUTION INTRAMUSCULAR at 08:56

## 2019-01-01 RX ADMIN — HYDROCODONE BITARTRATE AND ACETAMINOPHEN 1 TABLET: 5; 325 TABLET ORAL at 11:00

## 2019-01-01 RX ADMIN — KETOROLAC TROMETHAMINE 30 MG: 30 INJECTION, SOLUTION INTRAMUSCULAR at 11:03

## 2019-01-01 RX ADMIN — LIDOCAINE HYDROCHLORIDE 2.1 ML: 10 INJECTION, SOLUTION EPIDURAL; INFILTRATION; INTRACAUDAL; PERINEURAL at 11:05

## 2019-01-01 RX ADMIN — CEFTRIAXONE SODIUM 1 G: 1 INJECTION, POWDER, FOR SOLUTION INTRAMUSCULAR; INTRAVENOUS at 11:08

## 2019-01-01 RX ADMIN — DOXYCYCLINE HYCLATE 100 MG: 100 TABLET, COATED ORAL at 11:00

## 2019-01-01 RX ADMIN — METRONIDAZOLE 500 MG: 500 TABLET, FILM COATED ORAL at 11:00

## 2019-01-01 ASSESSMENT — PAIN DESCRIPTION - ONSET: ONSET: ON-GOING

## 2019-01-01 ASSESSMENT — ENCOUNTER SYMPTOMS
VOMITING: 0
SHORTNESS OF BREATH: 0
SINUS PAIN: 0
SORE THROAT: 0
TROUBLE SWALLOWING: 1
EYE DISCHARGE: 0
COUGH: 0
ABDOMINAL PAIN: 0
WHEEZING: 0
SHORTNESS OF BREATH: 0
RHINORRHEA: 0
BACK PAIN: 0
SORE THROAT: 1
NAUSEA: 0
NAUSEA: 0
ABDOMINAL PAIN: 0
DIARRHEA: 0
COUGH: 0
SHORTNESS OF BREATH: 0
RHINORRHEA: 1
COUGH: 0
SINUS PRESSURE: 0
NAUSEA: 0
ABDOMINAL PAIN: 0
NAUSEA: 0
ABDOMINAL PAIN: 0
EYE PAIN: 0
COUGH: 0
SHORTNESS OF BREATH: 0

## 2019-01-01 ASSESSMENT — PAIN SCALES - GENERAL
PAINLEVEL_OUTOF10: 8
PAINLEVEL_OUTOF10: 8
PAINLEVEL_OUTOF10: 4
PAINLEVEL_OUTOF10: 4
PAINLEVEL_OUTOF10: 8
PAINLEVEL_OUTOF10: 4

## 2019-01-01 ASSESSMENT — PAIN DESCRIPTION - PROGRESSION: CLINICAL_PROGRESSION: GRADUALLY WORSENING

## 2019-01-01 ASSESSMENT — PAIN DESCRIPTION - FREQUENCY: FREQUENCY: CONTINUOUS

## 2019-01-01 ASSESSMENT — PAIN DESCRIPTION - DESCRIPTORS: DESCRIPTORS: CRAMPING

## 2019-01-01 ASSESSMENT — PAIN DESCRIPTION - PAIN TYPE: TYPE: ACUTE PAIN

## 2019-01-01 ASSESSMENT — PAIN DESCRIPTION - LOCATION: LOCATION: PELVIS

## 2019-01-04 ENCOUNTER — OFFICE VISIT (OUTPATIENT)
Dept: FAMILY MEDICINE CLINIC | Age: 29
End: 2019-01-04
Payer: COMMERCIAL

## 2019-01-04 VITALS
SYSTOLIC BLOOD PRESSURE: 118 MMHG | WEIGHT: 143.6 LBS | TEMPERATURE: 97.8 F | HEIGHT: 62 IN | RESPIRATION RATE: 12 BRPM | BODY MASS INDEX: 26.43 KG/M2 | HEART RATE: 64 BPM | DIASTOLIC BLOOD PRESSURE: 64 MMHG

## 2019-01-04 DIAGNOSIS — F41.1 GAD (GENERALIZED ANXIETY DISORDER): ICD-10-CM

## 2019-01-04 DIAGNOSIS — G40.909 SEIZURE DISORDER (HCC): ICD-10-CM

## 2019-01-04 DIAGNOSIS — J30.2 SEASONAL ALLERGIC RHINITIS, UNSPECIFIED TRIGGER: ICD-10-CM

## 2019-01-04 DIAGNOSIS — R00.2 HEART PALPITATIONS: Primary | ICD-10-CM

## 2019-01-04 DIAGNOSIS — R00.0 SINUS TACHYCARDIA: ICD-10-CM

## 2019-01-04 PROCEDURE — 99214 OFFICE O/P EST MOD 30 MIN: CPT | Performed by: NURSE PRACTITIONER

## 2019-01-04 RX ORDER — ACETAMINOPHEN 325 MG/1
650 TABLET ORAL EVERY 6 HOURS PRN
COMMUNITY

## 2019-01-04 RX ORDER — FLUTICASONE PROPIONATE 50 MCG
SPRAY, SUSPENSION (ML) NASAL
Qty: 16 G | Refills: 11 | Status: SHIPPED | OUTPATIENT
Start: 2019-01-04 | End: 2020-01-01 | Stop reason: SDUPTHER

## 2019-01-04 RX ORDER — MONTELUKAST SODIUM 10 MG/1
10 TABLET ORAL NIGHTLY
Qty: 30 TABLET | Refills: 11 | Status: SHIPPED | OUTPATIENT
Start: 2019-01-04 | End: 2020-01-01

## 2019-01-04 RX ORDER — METOPROLOL TARTRATE 50 MG/1
50 TABLET, FILM COATED ORAL 2 TIMES DAILY
Qty: 180 TABLET | Refills: 3 | Status: SHIPPED | OUTPATIENT
Start: 2019-01-04 | End: 2019-01-01 | Stop reason: SDUPTHER

## 2019-01-04 RX ORDER — OLOPATADINE HYDROCHLORIDE 1 MG/ML
1 SOLUTION/ DROPS OPHTHALMIC 2 TIMES DAILY PRN
Qty: 5 ML | Refills: 11 | Status: SHIPPED | OUTPATIENT
Start: 2019-01-04 | End: 2019-01-01 | Stop reason: SDUPTHER

## 2019-01-04 ASSESSMENT — ENCOUNTER SYMPTOMS
COUGH: 0
ABDOMINAL PAIN: 0
NAUSEA: 0
SHORTNESS OF BREATH: 0

## 2019-01-31 ENCOUNTER — TELEPHONE (OUTPATIENT)
Dept: FAMILY MEDICINE CLINIC | Age: 29
End: 2019-01-31

## 2019-05-20 NOTE — TELEPHONE ENCOUNTER
Delano Zhou called requesting a refill on the following medications:  Requested Prescriptions     Pending Prescriptions Disp Refills    zolpidem (AMBIEN) 10 MG tablet 30 tablet 5     Sig: Take 1 tablet by mouth nightly as needed for Sleep for up to 180 days.      Pharmacy verified:  [facility]) in [facility]      Date of last visit: 1/4/19  Date of next visit (if applicable): 1/0/9331

## 2019-08-02 NOTE — PROGRESS NOTES
(H) 07/10/2018         Health Maintenance   Topic Date Due    Varicella Vaccine (1 of 2 - 13+ 2-dose series) 04/12/2001    HIV screen  04/12/2003    DTaP/Tdap/Td vaccine (1 - Tdap) 04/12/2007    Cervical cancer screen  04/12/2009    Pneumococcal 0-64 years Vaccine  Aged [employer] History   Administered Date(s) Administered    Influenza Virus Vaccine 10/16/2017, 10/03/2018       Review of Systems   Constitutional: Negative for chills and fever. HENT: Negative. Respiratory: Negative for cough and shortness of breath. Cardiovascular: Negative for chest pain. Gastrointestinal: Negative for abdominal pain and nausea. Skin: Negative for rash. Allergic/Immunologic: Positive for environmental allergies. Neurological: Negative for dizziness, light-headedness and headaches. Psychiatric/Behavioral: Positive for dysphoric mood. The patient is nervous/anxious. Objective:   Physical Exam   Constitutional: Vital signs are normal. No distress. HENT:   Mouth/Throat: Posterior oropharyngeal edema present. No posterior oropharyngeal erythema. Eyes: Pupils are equal, round, and reactive to light. EOM are normal.   Neck: Normal range of motion. Neck supple. Cardiovascular: Normal rate and regular rhythm. No murmur heard. Pulmonary/Chest: Effort normal and breath sounds normal. She has no wheezes. Abdominal: Soft. Bowel sounds are normal. She exhibits no distension. There is no tenderness. Musculoskeletal: Normal range of motion. She exhibits no tenderness. Skin: Skin is warm and dry. No rash noted. Psychiatric: Her behavior is normal. Her mood appears anxious. She is not slowed and not withdrawn. She expresses impulsivity. She does not exhibit a depressed mood. Assessment:       Diagnosis Orders   1. Panic attack  LORazepam (ATIVAN) 0.5 MG tablet   2.  ZAIDA (generalized anxiety disorder)  desvenlafaxine succinate (PRISTIQ) 50 MG TB24 extended release tablet

## 2019-08-19 NOTE — PROGRESS NOTES
adenopathy present. Skin: Skin is warm and dry. No rash noted. Assessment:       Diagnosis Orders   1. Acute pharyngitis, unspecified etiology  cefdinir (OMNICEF) 300 MG capsule   2.  Lymphadenopathy, cervical  cefdinir (OMNICEF) 300 MG capsule           Plan:      Orders as above   Fluids and rest  RTO if symptoms worsen or stay the same              Joan Seaman, APRN - CNP

## 2019-09-03 NOTE — TELEPHONE ENCOUNTER
Manfred Garcia called requesting a refill on the following medications:  Requested Prescriptions     Pending Prescriptions Disp Refills    LORazepam (ATIVAN) 0.5 MG tablet 60 tablet 0     Sig: Take 1 tablet by mouth every 6 hours as needed for Anxiety for up to 30 days.      Pharmacy verified:  [facility]    [address] [place] [address],[facility]    Date of last visit: 8/19/19  Date of next visit (if applicable): 0/2/2302

## 2019-09-10 NOTE — ED PROVIDER NOTES
Kenny Serrano [address] COMPLAINT       Chief Complaint   Patient presents with    Pelvic Pain       Nurses Notes reviewed and I agree except as noted in the HPI. HISTORY OF PRESENT ILLNESS    David Yeboah is a 30 y.o. female who presents to the Emergency Department from home for the evaluation of pelvic pain onset 2 weeks. Patient states she had unprotected intercourse at the end of August and started having burning and pain in her \"vaginal canal\". She states she was on antibiotics at the time for a sinus infection. Patient states that for the past 2 days, her pain is now in her pelvic area. She states her pelvis feels crampy and inflamed, which are symptoms she gets before starting her menstrual cycle. Her LMP was at the end of August. Reports clear white vaginal discharge and dysuria Denies fever or chills. Patient denies ever being pregnant. She denies having an OB/GYN at this moment. No further complaints at the time of the initial encounter. The HPI was provided by the patient. REVIEW OF SYSTEMS     Review of Systems   Constitutional: Negative for appetite change, chills, fatigue and fever. HENT: Negative for congestion, ear pain, rhinorrhea and sore throat. Eyes: Negative for pain, discharge and visual disturbance. Respiratory: Negative for cough, shortness of breath and wheezing. Cardiovascular: Negative for chest pain, palpitations and leg swelling. Gastrointestinal: Negative for abdominal pain, diarrhea, nausea and vomiting. Genitourinary: Positive for pelvic pain and vaginal pain. Negative for decreased urine volume, difficulty urinating, dysuria, flank pain, frequency, hematuria, urgency, vaginal bleeding and vaginal discharge. Musculoskeletal: Negative for arthralgias, back pain, joint swelling and neck pain. Skin: Negative for pallor and rash.    Neurological: Negative for dizziness, syncope, weakness, light-headedness, nontender pelvic exam demonstrates friable erythematous cervix scant discharge which is clear. Her blood work was reassuring her pelvic ultrasound demonstrated may be a mucous cyst versus fibroid but no evidence of PID or abscess. Negative pregnancy. Urinalysis showed UTI with hematuria and positive nitrite patient started on antibiotics as well as given Flagyl for likely cervicitis versus vaginitis. Pain resolved. reviewed return cautions. Patient agreeable to plan of care. CRITICAL CARE:   None    CONSULTS:      PROCEDURES:  None     FINAL IMPRESSION      1. Acute cystitis with hematuria    2. Abdominal pain, unspecified abdominal location    Cervitis      DISPOSITION/PLAN   Home, follow-up     PATIENT REFERRED TO:  No follow-up provider specified. DISCHARGE MEDICATIONS:  New Prescriptions    No medications on file       (Please note that portions of this note were completed with a voice recognition program.  Efforts were made to edit the dictations but occasionally words aremis-transcribed.)    Scribe:  Celi Us 9/10/19 8:36 AM Scribing for and in the presence of Jorge Velarde DO. Signed by: Priscila Garcia, 09/10/19 9:22 AM    Provider:  I personally performed theservices described in the documentation, reviewed and edited the documentation which was dictated to the scribe in my presence, and it accurately records my words and actions.     Jorge Velarde DO 9/10/19 9:22 AM        Jorge Velarde DO  09/10/19 [id number]

## 2019-09-10 NOTE — ED TRIAGE NOTES
Patient arrived to room 4 with c/o pelvic pain. Patient stated this started about 2 weeks ago. Patient stated she was seeing someone and had intercourse and told him to use a condom and he didn't so she stopped it and now has burning and pain in her pelvic area. Patient stated she was on a an antibiotic when this occurred. Patient stated she doesn't have any burning when she urinates. Patient stated she does have white colored discharge, denies any smell.

## 2019-09-12 NOTE — TELEPHONE ENCOUNTER
Called and updated the patient, he stated that she will wait till tomorrow and see how she feels and will all if she needs to be seen.

## 2019-09-15 NOTE — PROGRESS NOTES
Pharmacy Note  ED Culture Follow-up    Kiel Mai is a 30 y.o. female. Allergies: Amoxicillin; Avelox [moxifloxacin]; Cleocin [clindamycin hcl]; Levaquin [levofloxacin]; Sherlynn Avers; Cyclobenzaprine; Influenza vaccines; Nsaids; Tolmetin; Tramadol; and Vimpat [lacosamide]     Labs:  Lab Results   Component Value Date    BUN 7 09/10/2019    CREATININE 0.8 09/10/2019    WBC 8.7 09/10/2019     Estimated Creatinine Clearance: 92 mL/min (based on SCr of 0.8 mg/dL). Current antimicrobials:   cefdinir    ASSESSMENT:  Micro results:   Urine culture: positive for e. coli      PLAN:  Need for intervention: No 2/2 s-3rd gen cep   Discussed with: Shirley Valdez PA-C  Chosen treatment:    Patient already on appropriate treatment as above    Patient response:   No need to contact patient    Called/sent in prescription to: Not applicable    Please call with any questions.  Ext. [phone]

## 2020-01-01 ENCOUNTER — HOSPITAL ENCOUNTER (EMERGENCY)
Age: 30
Discharge: HOME | End: 2020-02-04
Attending: EMERGENCY MEDICINE
Payer: COMMERCIAL

## 2020-01-01 ENCOUNTER — OFFICE VISIT (OUTPATIENT)
Dept: FAMILY MEDICINE CLINIC | Age: 30
End: 2020-01-01
Payer: COMMERCIAL

## 2020-01-01 ENCOUNTER — TELEPHONE (OUTPATIENT)
Dept: FAMILY MEDICINE CLINIC | Age: 30
End: 2020-01-01

## 2020-01-01 ENCOUNTER — APPOINTMENT (OUTPATIENT)
Dept: CT IMAGING | Age: 30
End: 2020-01-01
Payer: COMMERCIAL

## 2020-01-01 ENCOUNTER — CARE COORDINATION (OUTPATIENT)
Dept: OTHER | Facility: CLINIC | Age: 30
End: 2020-01-01

## 2020-01-01 ENCOUNTER — APPOINTMENT (OUTPATIENT)
Dept: ULTRASOUND IMAGING | Age: 30
End: 2020-01-01
Payer: COMMERCIAL

## 2020-01-01 ENCOUNTER — HOSPITAL ENCOUNTER (EMERGENCY)
Age: 30
Discharge: HOME | End: 2020-03-24
Attending: EMERGENCY MEDICINE
Payer: COMMERCIAL

## 2020-01-01 ENCOUNTER — PATIENT MESSAGE (OUTPATIENT)
Dept: FAMILY MEDICINE CLINIC | Age: 30
End: 2020-01-01

## 2020-01-01 ENCOUNTER — HOSPITAL ENCOUNTER (EMERGENCY)
Age: 30
Discharge: HOME | End: 2020-02-02
Attending: FAMILY MEDICINE
Payer: COMMERCIAL

## 2020-01-01 ENCOUNTER — HOSPITAL ENCOUNTER (EMERGENCY)
Age: 30
Discharge: HOME | End: 2020-01-02
Payer: COMMERCIAL

## 2020-01-01 VITALS
OXYGEN SATURATION: 100 % | TEMPERATURE: 97.3 F | DIASTOLIC BLOOD PRESSURE: 73 MMHG | BODY MASS INDEX: 27.38 KG/M2 | SYSTOLIC BLOOD PRESSURE: 109 MMHG | WEIGHT: 149.7 LBS | HEART RATE: 80 BPM | RESPIRATION RATE: 18 BRPM

## 2020-01-01 VITALS
SYSTOLIC BLOOD PRESSURE: 157 MMHG | WEIGHT: 148 LBS | DIASTOLIC BLOOD PRESSURE: 104 MMHG | TEMPERATURE: 98.1 F | BODY MASS INDEX: 27.07 KG/M2 | RESPIRATION RATE: 18 BRPM | HEART RATE: 100 BPM | OXYGEN SATURATION: 100 %

## 2020-01-01 VITALS
SYSTOLIC BLOOD PRESSURE: 128 MMHG | RESPIRATION RATE: 16 BRPM | WEIGHT: 149.7 LBS | HEIGHT: 62 IN | HEART RATE: 80 BPM | BODY MASS INDEX: 27.55 KG/M2 | DIASTOLIC BLOOD PRESSURE: 70 MMHG

## 2020-01-01 VITALS
HEART RATE: 108 BPM | TEMPERATURE: 98.3 F | HEIGHT: 62 IN | DIASTOLIC BLOOD PRESSURE: 82 MMHG | SYSTOLIC BLOOD PRESSURE: 115 MMHG | BODY MASS INDEX: 22.08 KG/M2 | WEIGHT: 120 LBS | RESPIRATION RATE: 16 BRPM | OXYGEN SATURATION: 100 %

## 2020-01-01 VITALS
BODY MASS INDEX: 27.6 KG/M2 | SYSTOLIC BLOOD PRESSURE: 116 MMHG | RESPIRATION RATE: 18 BRPM | OXYGEN SATURATION: 100 % | HEIGHT: 62 IN | WEIGHT: 150 LBS | DIASTOLIC BLOOD PRESSURE: 93 MMHG | HEART RATE: 85 BPM | TEMPERATURE: 98.3 F

## 2020-01-01 LAB
ADENOVIRUS F 40 41 PCR: NOT DETECTED
ALBUMIN SERPL-MCNC: 4.6 G/DL (ref 3.5–5.1)
ALBUMIN SERPL-MCNC: 4.7 G/DL (ref 3.5–5.1)
ALBUMIN SERPL-MCNC: 5.2 G/DL (ref 3.5–5.1)
ALP BLD-CCNC: 72 U/L (ref 38–126)
ALP BLD-CCNC: 80 U/L (ref 38–126)
ALP BLD-CCNC: 87 U/L (ref 38–126)
ALT SERPL-CCNC: 28 U/L (ref 11–66)
ALT SERPL-CCNC: 32 U/L (ref 11–66)
ALT SERPL-CCNC: 38 U/L (ref 11–66)
ANION GAP SERPL CALCULATED.3IONS-SCNC: 14 MEQ/L (ref 8–16)
ANION GAP SERPL CALCULATED.3IONS-SCNC: 20 MEQ/L (ref 8–16)
ANION GAP SERPL CALCULATED.3IONS-SCNC: 28 MEQ/L (ref 8–16)
AST SERPL-CCNC: 22 U/L (ref 5–40)
AST SERPL-CCNC: 25 U/L (ref 5–40)
AST SERPL-CCNC: 35 U/L (ref 5–40)
ASTROVIRUS PCR: NOT DETECTED
BACTERIA: ABNORMAL /HPF
BASOPHILS # BLD: 0.3 %
BASOPHILS # BLD: 0.3 %
BASOPHILS # BLD: 0.6 %
BASOPHILS ABSOLUTE: 0 THOU/MM3 (ref 0–0.1)
BASOPHILS ABSOLUTE: 0 THOU/MM3 (ref 0–0.1)
BASOPHILS ABSOLUTE: 0.1 THOU/MM3 (ref 0–0.1)
BILIRUB SERPL-MCNC: 0.3 MG/DL (ref 0.3–1.2)
BILIRUB SERPL-MCNC: 0.4 MG/DL (ref 0.3–1.2)
BILIRUB SERPL-MCNC: 0.5 MG/DL (ref 0.3–1.2)
BILIRUBIN DIRECT: < 0.2 MG/DL (ref 0–0.3)
BILIRUBIN URINE: NEGATIVE
BLOOD, URINE: ABNORMAL
BUN BLDV-MCNC: 8 MG/DL (ref 7–22)
BUN BLDV-MCNC: 8 MG/DL (ref 7–22)
BUN BLDV-MCNC: 9 MG/DL (ref 7–22)
C DIFF TOXIN/ANTIGEN: NEGATIVE
C-REACTIVE PROTEIN: 0.06 MG/DL (ref 0–1)
CALCIUM SERPL-MCNC: 9.1 MG/DL (ref 8.5–10.5)
CALCIUM SERPL-MCNC: 9.4 MG/DL (ref 8.5–10.5)
CALCIUM SERPL-MCNC: 9.5 MG/DL (ref 8.5–10.5)
CAMPYLOBACTER PCR: NOT DETECTED
CASTS 2: ABNORMAL /LPF
CASTS UA: ABNORMAL /LPF
CHARACTER, URINE: CLEAR
CHLAMYDIA TRACHOMATIS BY RT-PCR: NOT DETECTED
CHLORIDE BLD-SCNC: 100 MEQ/L (ref 98–111)
CHLORIDE BLD-SCNC: 100 MEQ/L (ref 98–111)
CHLORIDE BLD-SCNC: 98 MEQ/L (ref 98–111)
CLOSTRIDIUM DIFFICILE, PCR: NORMAL
CO2: 15 MEQ/L (ref 23–33)
CO2: 20 MEQ/L (ref 23–33)
CO2: 26 MEQ/L (ref 23–33)
COLOR: YELLOW
CREAT SERPL-MCNC: 0.7 MG/DL (ref 0.4–1.2)
CREAT SERPL-MCNC: 0.8 MG/DL (ref 0.4–1.2)
CREAT SERPL-MCNC: 0.8 MG/DL (ref 0.4–1.2)
CRYPTOSPORIDIUM PCR: NOT DETECTED
CRYSTALS, UA: ABNORMAL
CT/NG SOURCE: NORMAL
CYCLOSPORA CAYETANENSIS PCR: NOT DETECTED
E COLI 0157 PCR: NORMAL
E COLI ENTEROAGGREGATIVE PCR: NOT DETECTED
E COLI ENTEROPATHOGENIC PCR: NOT DETECTED
E COLI ENTEROTOXIGENIC PCR: NOT DETECTED
E COLI SHIGA LIKE TOXIN PCR: NOT DETECTED
E COLI SHIGELLA/ENTEROINVASIVE PCR: NOT DETECTED
E HISTOLYTICA GI FILM ARRAY: NOT DETECTED
EKG ATRIAL RATE: 89 BPM
EKG P AXIS: 63 DEGREES
EKG P-R INTERVAL: 136 MS
EKG Q-T INTERVAL: 384 MS
EKG QRS DURATION: 82 MS
EKG QTC CALCULATION (BAZETT): 467 MS
EKG R AXIS: 56 DEGREES
EKG T AXIS: 39 DEGREES
EKG VENTRICULAR RATE: 89 BPM
EOSINOPHIL # BLD: 1.5 %
EOSINOPHIL # BLD: 2.1 %
EOSINOPHIL # BLD: 2.4 %
EOSINOPHILS ABSOLUTE: 0.1 THOU/MM3 (ref 0–0.4)
EOSINOPHILS ABSOLUTE: 0.1 THOU/MM3 (ref 0–0.4)
EOSINOPHILS ABSOLUTE: 0.2 THOU/MM3 (ref 0–0.4)
EPITHELIAL CELLS, UA: ABNORMAL /HPF
ERYTHROCYTE [DISTWIDTH] IN BLOOD BY AUTOMATED COUNT: 12.8 % (ref 11.5–14.5)
ERYTHROCYTE [DISTWIDTH] IN BLOOD BY AUTOMATED COUNT: 13.2 % (ref 11.5–14.5)
ERYTHROCYTE [DISTWIDTH] IN BLOOD BY AUTOMATED COUNT: 13.4 % (ref 11.5–14.5)
ERYTHROCYTE [DISTWIDTH] IN BLOOD BY AUTOMATED COUNT: 41.9 FL (ref 35–45)
ERYTHROCYTE [DISTWIDTH] IN BLOOD BY AUTOMATED COUNT: 42 FL (ref 35–45)
ERYTHROCYTE [DISTWIDTH] IN BLOOD BY AUTOMATED COUNT: 43.1 FL (ref 35–45)
FLU A ANTIGEN: NEGATIVE
FLU B ANTIGEN: NEGATIVE
GENITAL CULTURE, ROUTINE: NORMAL
GFR SERPL CREATININE-BSD FRML MDRD: 83 ML/MIN/1.73M2
GFR SERPL CREATININE-BSD FRML MDRD: 83 ML/MIN/1.73M2
GFR SERPL CREATININE-BSD FRML MDRD: > 90 ML/MIN/1.73M2
GIARDIA LAMBLIA PCR: NOT DETECTED
GLUCOSE BLD-MCNC: 135 MG/DL (ref 70–108)
GLUCOSE BLD-MCNC: 140 MG/DL (ref 70–108)
GLUCOSE BLD-MCNC: 92 MG/DL (ref 70–108)
GLUCOSE URINE: NEGATIVE MG/DL
GRAM STAIN RESULT: NORMAL
HCT VFR BLD CALC: 40.9 % (ref 37–47)
HCT VFR BLD CALC: 42 % (ref 37–47)
HCT VFR BLD CALC: 46.5 % (ref 37–47)
HEMOGLOBIN: 13.3 GM/DL (ref 12–16)
HEMOGLOBIN: 13.6 GM/DL (ref 12–16)
HEMOGLOBIN: 14.7 GM/DL (ref 12–16)
IMMATURE GRANS (ABS): 0.01 THOU/MM3 (ref 0–0.07)
IMMATURE GRANS (ABS): 0.01 THOU/MM3 (ref 0–0.07)
IMMATURE GRANS (ABS): 0.02 THOU/MM3 (ref 0–0.07)
IMMATURE GRANULOCYTES: 0.1 %
IMMATURE GRANULOCYTES: 0.2 %
IMMATURE GRANULOCYTES: 0.2 %
KETONES, URINE: NEGATIVE
KOH PREP: NORMAL
LEUKOCYTE ESTERASE, URINE: NEGATIVE
LIPASE: 158.9 U/L (ref 5.6–51.3)
LYMPHOCYTES # BLD: 31.2 %
LYMPHOCYTES # BLD: 35.3 %
LYMPHOCYTES # BLD: 35.4 %
LYMPHOCYTES ABSOLUTE: 2.1 THOU/MM3 (ref 1–4.8)
LYMPHOCYTES ABSOLUTE: 2.3 THOU/MM3 (ref 1–4.8)
LYMPHOCYTES ABSOLUTE: 3.1 THOU/MM3 (ref 1–4.8)
MAGNESIUM: 2.3 MG/DL (ref 1.6–2.4)
MCH RBC QN AUTO: 28.4 PG (ref 26–33)
MCH RBC QN AUTO: 28.6 PG (ref 26–33)
MCH RBC QN AUTO: 28.7 PG (ref 26–33)
MCHC RBC AUTO-ENTMCNC: 31.6 GM/DL (ref 32.2–35.5)
MCHC RBC AUTO-ENTMCNC: 32.4 GM/DL (ref 32.2–35.5)
MCHC RBC AUTO-ENTMCNC: 32.5 GM/DL (ref 32.2–35.5)
MCV RBC AUTO: 87.7 FL (ref 81–99)
MCV RBC AUTO: 88.1 FL (ref 81–99)
MCV RBC AUTO: 90.5 FL (ref 81–99)
MISCELLANEOUS 2: ABNORMAL
MONOCYTES # BLD: 6.2 %
MONOCYTES # BLD: 7.9 %
MONOCYTES # BLD: 8.3 %
MONOCYTES ABSOLUTE: 0.5 THOU/MM3 (ref 0.4–1.3)
MONOCYTES ABSOLUTE: 0.5 THOU/MM3 (ref 0.4–1.3)
MONOCYTES ABSOLUTE: 0.7 THOU/MM3 (ref 0.4–1.3)
NEISSERIA GONORRHOEAE BY RT-PCR: NOT DETECTED
NITRITE, URINE: NEGATIVE
NOROVIRUS GI GII PCR: NOT DETECTED
NUCLEATED RED BLOOD CELLS: 0 /100 WBC
OSMOLALITY CALCULATION: 277.7 MOSMOL/KG (ref 275–300)
OSMOLALITY CALCULATION: 279.8 MOSMOL/KG (ref 275–300)
OSMOLALITY CALCULATION: 281.9 MOSMOL/KG (ref 275–300)
PH UA: 6 (ref 5–9)
PLATELET # BLD: 313 THOU/MM3 (ref 130–400)
PLATELET # BLD: 330 THOU/MM3 (ref 130–400)
PLATELET # BLD: 357 THOU/MM3 (ref 130–400)
PLESIOMONAS SHIGELLOIDES PCR: NOT DETECTED
PMV BLD AUTO: 10.2 FL (ref 9.4–12.4)
PMV BLD AUTO: 11.2 FL (ref 9.4–12.4)
PMV BLD AUTO: 9.9 FL (ref 9.4–12.4)
POTASSIUM SERPL-SCNC: 3.1 MEQ/L (ref 3.5–5.2)
POTASSIUM SERPL-SCNC: 3.4 MEQ/L (ref 3.5–5.2)
POTASSIUM SERPL-SCNC: 3.6 MEQ/L (ref 3.5–5.2)
PREGNANCY, SERUM: NEGATIVE
PROTEIN UA: NEGATIVE
RBC # BLD: 4.64 MILL/MM3 (ref 4.2–5.4)
RBC # BLD: 4.79 MILL/MM3 (ref 4.2–5.4)
RBC # BLD: 5.14 MILL/MM3 (ref 4.2–5.4)
RBC URINE: ABNORMAL /HPF
RENAL EPITHELIAL, UA: ABNORMAL
ROTAVIRUS A PCR: NOT DETECTED
SALMONELLA PCR: NOT DETECTED
SAPOVIRUS PCR: NOT DETECTED
SEG NEUTROPHILS: 53.5 %
SEG NEUTROPHILS: 53.8 %
SEG NEUTROPHILS: 60.7 %
SEGMENTED NEUTROPHILS ABSOLUTE COUNT: 3.2 THOU/MM3 (ref 1.8–7.7)
SEGMENTED NEUTROPHILS ABSOLUTE COUNT: 4.6 THOU/MM3 (ref 1.8–7.7)
SEGMENTED NEUTROPHILS ABSOLUTE COUNT: 4.7 THOU/MM3 (ref 1.8–7.7)
SODIUM BLD-SCNC: 140 MEQ/L (ref 135–145)
SODIUM BLD-SCNC: 140 MEQ/L (ref 135–145)
SODIUM BLD-SCNC: 141 MEQ/L (ref 135–145)
SPECIFIC GRAVITY, URINE: > 1.03 (ref 1–1.03)
TOTAL PROTEIN: 7.4 G/DL (ref 6.1–8)
TOTAL PROTEIN: 7.7 G/DL (ref 6.1–8)
TOTAL PROTEIN: 8 G/DL (ref 6.1–8)
TRICHOMONAS PREP: NORMAL
UROBILINOGEN, URINE: 1 EU/DL (ref 0–1)
VIBRIO CHOLERAE PCR: NOT DETECTED
VIBRIO PCR: NOT DETECTED
WBC # BLD: 5.9 THOU/MM3 (ref 4.8–10.8)
WBC # BLD: 7.5 THOU/MM3 (ref 4.8–10.8)
WBC # BLD: 8.8 THOU/MM3 (ref 4.8–10.8)
WBC UA: ABNORMAL /HPF
YEAST: ABNORMAL
YERSINIA ENTEROCOLITICA PCR: NOT DETECTED

## 2020-01-01 PROCEDURE — 80053 COMPREHEN METABOLIC PANEL: CPT

## 2020-01-01 PROCEDURE — 83690 ASSAY OF LIPASE: CPT

## 2020-01-01 PROCEDURE — 87491 CHLMYD TRACH DNA AMP PROBE: CPT

## 2020-01-01 PROCEDURE — 85025 COMPLETE CBC W/AUTO DIFF WBC: CPT

## 2020-01-01 PROCEDURE — 36415 COLL VENOUS BLD VENIPUNCTURE: CPT

## 2020-01-01 PROCEDURE — 96374 THER/PROPH/DIAG INJ IV PUSH: CPT

## 2020-01-01 PROCEDURE — 82248 BILIRUBIN DIRECT: CPT

## 2020-01-01 PROCEDURE — 70450 CT HEAD/BRAIN W/O DYE: CPT

## 2020-01-01 PROCEDURE — 6360000002 HC RX W HCPCS: Performed by: EMERGENCY MEDICINE

## 2020-01-01 PROCEDURE — 74177 CT ABD & PELVIS W/CONTRAST: CPT

## 2020-01-01 PROCEDURE — 76830 TRANSVAGINAL US NON-OB: CPT

## 2020-01-01 PROCEDURE — 2580000003 HC RX 258: Performed by: FAMILY MEDICINE

## 2020-01-01 PROCEDURE — 2500000003 HC RX 250 WO HCPCS: Performed by: FAMILY MEDICINE

## 2020-01-01 PROCEDURE — 87210 SMEAR WET MOUNT SALINE/INK: CPT

## 2020-01-01 PROCEDURE — 84703 CHORIONIC GONADOTROPIN ASSAY: CPT

## 2020-01-01 PROCEDURE — 6360000002 HC RX W HCPCS: Performed by: FAMILY MEDICINE

## 2020-01-01 PROCEDURE — 6360000004 HC RX CONTRAST MEDICATION: Performed by: FAMILY MEDICINE

## 2020-01-01 PROCEDURE — 87591 N.GONORRHOEAE DNA AMP PROB: CPT

## 2020-01-01 PROCEDURE — 99284 EMERGENCY DEPT VISIT MOD MDM: CPT

## 2020-01-01 PROCEDURE — 96372 THER/PROPH/DIAG INJ SC/IM: CPT

## 2020-01-01 PROCEDURE — 83735 ASSAY OF MAGNESIUM: CPT

## 2020-01-01 PROCEDURE — 99214 OFFICE O/P EST MOD 30 MIN: CPT | Performed by: NURSE PRACTITIONER

## 2020-01-01 PROCEDURE — 96375 TX/PRO/DX INJ NEW DRUG ADDON: CPT

## 2020-01-01 PROCEDURE — 86140 C-REACTIVE PROTEIN: CPT

## 2020-01-01 PROCEDURE — 87220 TISSUE EXAM FOR FUNGI: CPT

## 2020-01-01 PROCEDURE — 99213 OFFICE O/P EST LOW 20 MIN: CPT | Performed by: NURSE PRACTITIONER

## 2020-01-01 PROCEDURE — 81001 URINALYSIS AUTO W/SCOPE: CPT

## 2020-01-01 PROCEDURE — 87804 INFLUENZA ASSAY W/OPTIC: CPT

## 2020-01-01 PROCEDURE — 6370000000 HC RX 637 (ALT 250 FOR IP): Performed by: EMERGENCY MEDICINE

## 2020-01-01 PROCEDURE — 6360000004 HC RX CONTRAST MEDICATION: Performed by: EMERGENCY MEDICINE

## 2020-01-01 PROCEDURE — 93975 VASCULAR STUDY: CPT

## 2020-01-01 PROCEDURE — 87205 SMEAR GRAM STAIN: CPT

## 2020-01-01 PROCEDURE — 0097U HC GI PTHGN MULT REV TRANS & AMP PRB TECH 22 TRGT: CPT

## 2020-01-01 PROCEDURE — 99213 OFFICE O/P EST LOW 20 MIN: CPT

## 2020-01-01 PROCEDURE — 93005 ELECTROCARDIOGRAM TRACING: CPT | Performed by: EMERGENCY MEDICINE

## 2020-01-01 PROCEDURE — 87449 NOS EACH ORGANISM AG IA: CPT

## 2020-01-01 PROCEDURE — 87070 CULTURE OTHR SPECIMN AEROBIC: CPT

## 2020-01-01 RX ORDER — OLOPATADINE HYDROCHLORIDE 1 MG/ML
SOLUTION/ DROPS OPHTHALMIC
Qty: 5 ML | Refills: 11 | Status: SHIPPED | OUTPATIENT
Start: 2020-01-01

## 2020-01-01 RX ORDER — TIZANIDINE 4 MG/1
4 TABLET ORAL 3 TIMES DAILY
Qty: 15 TABLET | Refills: 0 | Status: SHIPPED | OUTPATIENT
Start: 2020-01-01 | End: 2020-01-01 | Stop reason: SDUPTHER

## 2020-01-01 RX ORDER — ONDANSETRON 2 MG/ML
4 INJECTION INTRAMUSCULAR; INTRAVENOUS ONCE
Status: COMPLETED | OUTPATIENT
Start: 2020-01-01 | End: 2020-01-01

## 2020-01-01 RX ORDER — MONTELUKAST SODIUM 10 MG/1
TABLET ORAL
Qty: 30 TABLET | Refills: 11 | Status: CANCELLED | OUTPATIENT
Start: 2020-01-01

## 2020-01-01 RX ORDER — LORAZEPAM 1 MG/1
TABLET ORAL
Qty: 60 TABLET | Refills: 2 | Status: CANCELLED | OUTPATIENT
Start: 2020-01-01

## 2020-01-01 RX ORDER — ONDANSETRON 4 MG/1
4 TABLET, ORALLY DISINTEGRATING ORAL ONCE
Status: COMPLETED | OUTPATIENT
Start: 2020-01-01 | End: 2020-01-01

## 2020-01-01 RX ORDER — TIZANIDINE 4 MG/1
4 TABLET ORAL 3 TIMES DAILY PRN
Qty: 270 TABLET | Refills: 1 | Status: SHIPPED | OUTPATIENT
Start: 2020-01-01

## 2020-01-01 RX ORDER — 0.9 % SODIUM CHLORIDE 0.9 %
1000 INTRAVENOUS SOLUTION INTRAVENOUS ONCE
Status: COMPLETED | OUTPATIENT
Start: 2020-01-01 | End: 2020-01-01

## 2020-01-01 RX ORDER — METOPROLOL TARTRATE 50 MG/1
TABLET, FILM COATED ORAL
Qty: 360 TABLET | Refills: 3 | Status: SHIPPED | OUTPATIENT
Start: 2020-01-01

## 2020-01-01 RX ORDER — ZOLPIDEM TARTRATE 10 MG/1
TABLET ORAL
Qty: 30 TABLET | Refills: 5 | Status: SHIPPED | OUTPATIENT
Start: 2020-01-01 | End: 2020-07-05

## 2020-01-01 RX ORDER — MORPHINE SULFATE 4 MG/ML
4 INJECTION, SOLUTION INTRAMUSCULAR; INTRAVENOUS ONCE
Status: COMPLETED | OUTPATIENT
Start: 2020-01-01 | End: 2020-01-01

## 2020-01-01 RX ORDER — SODIUM CHLORIDE 9 MG/ML
INJECTION, SOLUTION INTRAVENOUS CONTINUOUS
Status: DISCONTINUED | OUTPATIENT
Start: 2020-01-01 | End: 2020-01-01 | Stop reason: HOSPADM

## 2020-01-01 RX ORDER — HYDROCODONE BITARTRATE AND ACETAMINOPHEN 5; 325 MG/1; MG/1
1 TABLET ORAL ONCE
Status: DISCONTINUED | OUTPATIENT
Start: 2020-01-01 | End: 2020-01-01 | Stop reason: HOSPADM

## 2020-01-01 RX ORDER — CEFDINIR 300 MG/1
300 CAPSULE ORAL 2 TIMES DAILY
Qty: 14 CAPSULE | Refills: 0 | Status: SHIPPED | OUTPATIENT
Start: 2020-01-01 | End: 2020-01-01

## 2020-01-01 RX ORDER — TIZANIDINE 4 MG/1
4 TABLET ORAL 3 TIMES DAILY PRN
Qty: 270 TABLET | Refills: 1 | Status: SHIPPED | OUTPATIENT
Start: 2020-01-01 | End: 2020-01-01 | Stop reason: SDUPTHER

## 2020-01-01 RX ORDER — MONTELUKAST SODIUM 10 MG/1
TABLET ORAL
Qty: 30 TABLET | Refills: 11 | Status: SHIPPED | OUTPATIENT
Start: 2020-01-01

## 2020-01-01 RX ORDER — TIZANIDINE 4 MG/1
4 TABLET ORAL 3 TIMES DAILY
Qty: 30 TABLET | Refills: 1 | Status: SHIPPED | OUTPATIENT
Start: 2020-01-01 | End: 2020-01-01 | Stop reason: SDUPTHER

## 2020-01-01 RX ORDER — DEXTROMETHORPHAN HYDROBROMIDE AND PROMETHAZINE HYDROCHLORIDE 15; 6.25 MG/5ML; MG/5ML
5 SYRUP ORAL 4 TIMES DAILY PRN
Qty: 140 ML | Refills: 0 | Status: SHIPPED | OUTPATIENT
Start: 2020-01-01 | End: 2020-01-01

## 2020-01-01 RX ORDER — BENZONATATE 200 MG/1
200 CAPSULE ORAL 3 TIMES DAILY PRN
Qty: 21 CAPSULE | Refills: 0 | Status: SHIPPED | OUTPATIENT
Start: 2020-01-01 | End: 2020-01-01

## 2020-01-01 RX ORDER — PROMETHAZINE HYDROCHLORIDE 25 MG/1
25 TABLET ORAL EVERY 6 HOURS PRN
Qty: 28 TABLET | Refills: 0 | Status: SHIPPED | OUTPATIENT
Start: 2020-01-01 | End: 2020-01-01

## 2020-01-01 RX ORDER — FLUTICASONE PROPIONATE 50 MCG
SPRAY, SUSPENSION (ML) NASAL
Qty: 16 G | Refills: 11 | Status: SHIPPED | OUTPATIENT
Start: 2020-01-01

## 2020-01-01 RX ORDER — PREDNISONE 20 MG/1
40 TABLET ORAL DAILY
Qty: 14 TABLET | Refills: 0 | Status: SHIPPED | OUTPATIENT
Start: 2020-01-01 | End: 2020-01-01

## 2020-01-01 RX ORDER — HYDROCODONE BITARTRATE AND ACETAMINOPHEN 5; 325 MG/1; MG/1
1 TABLET ORAL EVERY 6 HOURS PRN
Qty: 20 TABLET | Refills: 0 | Status: SHIPPED | OUTPATIENT
Start: 2020-01-01 | End: 2020-01-01

## 2020-01-01 RX ORDER — ONDANSETRON 2 MG/ML
INJECTION INTRAMUSCULAR; INTRAVENOUS
Status: DISCONTINUED
Start: 2020-01-01 | End: 2020-01-01 | Stop reason: HOSPADM

## 2020-01-01 RX ORDER — ALBUTEROL SULFATE 90 UG/1
2 AEROSOL, METERED RESPIRATORY (INHALATION) EVERY 4 HOURS PRN
Qty: 1 INHALER | Refills: 0 | Status: SHIPPED | OUTPATIENT
Start: 2020-01-01

## 2020-01-01 RX ORDER — PROMETHAZINE HYDROCHLORIDE 25 MG/1
12.5-25 TABLET ORAL EVERY 6 HOURS PRN
Qty: 10 TABLET | Refills: 0 | Status: SHIPPED | OUTPATIENT
Start: 2020-01-01 | End: 2020-01-01

## 2020-01-01 RX ORDER — KETOROLAC TROMETHAMINE 30 MG/ML
30 INJECTION, SOLUTION INTRAMUSCULAR; INTRAVENOUS ONCE
Status: COMPLETED | OUTPATIENT
Start: 2020-01-01 | End: 2020-01-01

## 2020-01-01 RX ORDER — DICYCLOMINE HCL 20 MG
20 TABLET ORAL
Qty: 20 TABLET | Refills: 0 | Status: SHIPPED | OUTPATIENT
Start: 2020-01-01

## 2020-01-01 RX ORDER — LORAZEPAM 1 MG/1
TABLET ORAL
Qty: 60 TABLET | Refills: 2 | Status: SHIPPED | OUTPATIENT
Start: 2020-01-01 | End: 2020-01-01

## 2020-01-01 RX ORDER — LORAZEPAM 2 MG/ML
1 INJECTION INTRAMUSCULAR ONCE
Status: COMPLETED | OUTPATIENT
Start: 2020-01-01 | End: 2020-01-01

## 2020-01-01 RX ORDER — PROMETHAZINE HYDROCHLORIDE 25 MG/ML
25 INJECTION, SOLUTION INTRAMUSCULAR; INTRAVENOUS ONCE
Status: COMPLETED | OUTPATIENT
Start: 2020-01-01 | End: 2020-01-01

## 2020-01-01 RX ADMIN — MORPHINE SULFATE 4 MG: 4 INJECTION, SOLUTION INTRAMUSCULAR; INTRAVENOUS at 15:43

## 2020-01-01 RX ADMIN — MORPHINE SULFATE 4 MG: 4 INJECTION, SOLUTION INTRAMUSCULAR; INTRAVENOUS at 01:51

## 2020-01-01 RX ADMIN — IOPAMIDOL 80 ML: 755 INJECTION, SOLUTION INTRAVENOUS at 01:07

## 2020-01-01 RX ADMIN — ONDANSETRON 4 MG: 4 TABLET, ORALLY DISINTEGRATING ORAL at 23:57

## 2020-01-01 RX ADMIN — ONDANSETRON 4 MG: 2 INJECTION INTRAMUSCULAR; INTRAVENOUS at 15:43

## 2020-01-01 RX ADMIN — KETOROLAC TROMETHAMINE 30 MG: 30 INJECTION, SOLUTION INTRAMUSCULAR at 02:54

## 2020-01-01 RX ADMIN — FAMOTIDINE 20 MG: 10 INJECTION, SOLUTION INTRAVENOUS at 15:43

## 2020-01-01 RX ADMIN — IOPAMIDOL 80 ML: 755 INJECTION, SOLUTION INTRAVENOUS at 16:42

## 2020-01-01 RX ADMIN — LORAZEPAM 1 MG: 2 INJECTION INTRAMUSCULAR; INTRAVENOUS at 08:19

## 2020-01-01 RX ADMIN — ONDANSETRON 4 MG: 2 INJECTION INTRAMUSCULAR; INTRAVENOUS at 00:25

## 2020-01-01 RX ADMIN — PROMETHAZINE HYDROCHLORIDE 25 MG: 25 INJECTION INTRAMUSCULAR; INTRAVENOUS at 01:51

## 2020-01-01 RX ADMIN — SODIUM CHLORIDE 1000 ML: 9 INJECTION, SOLUTION INTRAVENOUS at 15:43

## 2020-01-01 SDOH — ECONOMIC STABILITY: TRANSPORTATION INSECURITY
IN THE PAST 12 MONTHS, HAS LACK OF TRANSPORTATION KEPT YOU FROM MEETINGS, WORK, OR FROM GETTING THINGS NEEDED FOR DAILY LIVING?: NO

## 2020-01-01 SDOH — ECONOMIC STABILITY: FOOD INSECURITY: WITHIN THE PAST 12 MONTHS, YOU WORRIED THAT YOUR FOOD WOULD RUN OUT BEFORE YOU GOT MONEY TO BUY MORE.: NEVER TRUE

## 2020-01-01 SDOH — ECONOMIC STABILITY: INCOME INSECURITY: HOW HARD IS IT FOR YOU TO PAY FOR THE VERY BASICS LIKE FOOD, HOUSING, MEDICAL CARE, AND HEATING?: NOT HARD AT ALL

## 2020-01-01 SDOH — ECONOMIC STABILITY: FOOD INSECURITY: WITHIN THE PAST 12 MONTHS, THE FOOD YOU BOUGHT JUST DIDN'T LAST AND YOU DIDN'T HAVE MONEY TO GET MORE.: NEVER TRUE

## 2020-01-01 SDOH — ECONOMIC STABILITY: TRANSPORTATION INSECURITY
IN THE PAST 12 MONTHS, HAS THE LACK OF TRANSPORTATION KEPT YOU FROM MEDICAL APPOINTMENTS OR FROM GETTING MEDICATIONS?: NO

## 2020-01-01 ASSESSMENT — PAIN DESCRIPTION - PAIN TYPE
TYPE: ACUTE PAIN

## 2020-01-01 ASSESSMENT — ENCOUNTER SYMPTOMS
NAUSEA: 0
SHORTNESS OF BREATH: 0
SORE THROAT: 0
SHORTNESS OF BREATH: 0
BLOOD IN STOOL: 0
RHINORRHEA: 0
NAUSEA: 0
NAUSEA: 0
NAUSEA: 1
COUGH: 1
TROUBLE SWALLOWING: 0
EYE REDNESS: 0
EYE DISCHARGE: 0
CHEST TIGHTNESS: 0
VOMITING: 1
COUGH: 0
ABDOMINAL PAIN: 0
ABDOMINAL PAIN: 1
COUGH: 0
DIARRHEA: 1
NAUSEA: 1
VOMITING: 0
COUGH: 0
VOICE CHANGE: 0
DIARRHEA: 0
VOMITING: 0
ABDOMINAL PAIN: 1
CONSTIPATION: 0
EYE ITCHING: 0
SORE THROAT: 0
SHORTNESS OF BREATH: 0
EYE PAIN: 0
VOMITING: 1
SORE THROAT: 1
ABDOMINAL PAIN: 0
WHEEZING: 0
SHORTNESS OF BREATH: 0
CHOKING: 0
CONSTIPATION: 0
BACK PAIN: 0
PHOTOPHOBIA: 0
BLOOD IN STOOL: 0
ABDOMINAL DISTENTION: 0
SINUS PRESSURE: 0
DIARRHEA: 0
RHINORRHEA: 0
SHORTNESS OF BREATH: 0

## 2020-01-01 ASSESSMENT — PAIN DESCRIPTION - FREQUENCY
FREQUENCY: CONTINUOUS

## 2020-01-01 ASSESSMENT — PAIN SCALES - GENERAL
PAINLEVEL_OUTOF10: 4
PAINLEVEL_OUTOF10: 10
PAINLEVEL_OUTOF10: 5
PAINLEVEL_OUTOF10: 8
PAINLEVEL_OUTOF10: 6
PAINLEVEL_OUTOF10: 6
PAINLEVEL_OUTOF10: 10
PAINLEVEL_OUTOF10: 8
PAINLEVEL_OUTOF10: 7
PAINLEVEL_OUTOF10: 8
PAINLEVEL_OUTOF10: 6

## 2020-01-01 ASSESSMENT — PAIN DESCRIPTION - LOCATION
LOCATION: HEAD
LOCATION: ABDOMEN

## 2020-01-01 ASSESSMENT — PAIN DESCRIPTION - DESCRIPTORS
DESCRIPTORS: ACHING;DISCOMFORT;CONSTANT
DESCRIPTORS: ACHING;CRAMPING;CONSTANT;DISCOMFORT
DESCRIPTORS: ACHING
DESCRIPTORS: ACHING

## 2020-01-01 ASSESSMENT — PAIN DESCRIPTION - PROGRESSION
CLINICAL_PROGRESSION: NOT CHANGED
CLINICAL_PROGRESSION: GRADUALLY WORSENING
CLINICAL_PROGRESSION: GRADUALLY IMPROVING
CLINICAL_PROGRESSION: GRADUALLY WORSENING

## 2020-01-01 ASSESSMENT — PAIN DESCRIPTION - ORIENTATION
ORIENTATION: RIGHT;LOWER
ORIENTATION: MID
ORIENTATION: RIGHT;LOWER
ORIENTATION: RIGHT;LOWER

## 2020-01-01 ASSESSMENT — PAIN DESCRIPTION - ONSET
ONSET: ON-GOING

## 2020-01-02 NOTE — ED PROVIDER NOTES
%,Estimated body mass index is 27.07 kg/m² as calculated from the following:    Height as of 9/10/19: 5' 2\" (1.575 m). Weight as of this encounter: 148 lb (67.1 kg). ,Patient's last menstrual period was 2019 (approximate). Physical Exam  Vitals signs and nursing note reviewed. Constitutional:       General: She is not in acute distress. Appearance: She is well-developed. She is not diaphoretic. HENT:      Right Ear: Tympanic membrane normal.      Left Ear: Tympanic membrane normal.      Nose:      Right Sinus: Frontal sinus tenderness present. No maxillary sinus tenderness. Left Sinus: Frontal sinus tenderness present. No maxillary sinus tenderness. Mouth/Throat:      Pharynx: Oropharynx is clear. Tonsils: No tonsillar exudate. Swellin on the right. 0 on the left. Comments: Bilateral tonsils are surgically absent. Eyes:      Conjunctiva/sclera:      Right eye: Right conjunctiva is not injected. Left eye: Left conjunctiva is not injected. Pupils: Pupils are equal.   Neck:      Musculoskeletal: Normal range of motion. Cardiovascular:      Rate and Rhythm: Normal rate and regular rhythm. Heart sounds: No murmur. Pulmonary:      Effort: Pulmonary effort is normal. No respiratory distress. Breath sounds: Normal breath sounds. Musculoskeletal:      Right knee: She exhibits normal range of motion. Left knee: She exhibits normal range of motion. Skin:     General: Skin is warm. Findings: No rash. Neurological:      Mental Status: She is alert and oriented to person, place, and time.    Psychiatric:         Behavior: Behavior normal.         DIAGNOSTIC RESULTS     Labs:  Results for orders placed or performed during the hospital encounter of 20   Rapid influenza A/B antigens   Result Value Ref Range    Flu A Antigen NEGATIVE NEGATIVE    Flu B Antigen NEGATIVE NEGATIVE       IMAGING:    No orders to display         EKG:  none    URGENT [address] [place] [address]

## 2020-01-02 NOTE — ED NOTES
Patient understood instructions verbally,  Follow up with PCP with any concerns, or worse URI symptoms  With elevated fevers,ollow up with ED. E-script, ambulated self to lobby,stable condition.       Isela Murry LPN  32/00/50 0890

## 2020-01-07 PROBLEM — F43.10 PTSD (POST-TRAUMATIC STRESS DISORDER): Status: ACTIVE | Noted: 2020-01-01

## 2020-01-07 NOTE — PROGRESS NOTES
09/10/2019 0857    K 3.4 (L) 09/10/2019 0857    CL 99 09/10/2019 0857    CO2 25 09/10/2019 0857    BUN 7 09/10/2019 0857    CREATININE 0.8 09/10/2019 0857        Component Value Date/Time    CALCIUM 9.9 09/10/2019 0857    ALKPHOS 92 07/10/2018 2203    AST 28 07/10/2018 2203    ALT 55 07/10/2018 2203    BILITOT 0.4 07/10/2018 2203            Lab Results   Component Value Date    TSH 4.000 03/05/2018       Lab Results   Component Value Date    WBC 8.7 09/10/2019    HGB 12.6 09/10/2019    HCT 39.3 09/10/2019    MCV 90.3 09/10/2019     09/10/2019         Health Maintenance   Topic Date Due    Varicella Vaccine (1 of 2 - 2-dose childhood series) 04/12/1989    DTaP/Tdap/Td vaccine (1 - Tdap) 04/12/1999    Cervical cancer screen  04/12/2009    HIV screen  Completed    Pneumococcal 0-64 years Vaccine  Aged [employer] History   Administered Date(s) Administered    Influenza Virus Vaccine 10/16/2017, 10/03/2018, 11/11/2019       Review of Systems   Constitutional: Negative for chills and fever. HENT: Negative. Respiratory: Negative for cough and shortness of breath. Cardiovascular: Positive for palpitations. Negative for chest pain. Gastrointestinal: Negative for abdominal pain and nausea. Skin: Negative for rash. Allergic/Immunologic: Positive for environmental allergies. Neurological: Negative for dizziness, light-headedness and headaches. Psychiatric/Behavioral: Positive for dysphoric mood. The patient is nervous/anxious. Objective:   Physical Exam  Constitutional:       General: She is not in acute distress. HENT:      Mouth/Throat:      Pharynx: No posterior oropharyngeal erythema. Eyes:      Pupils: Pupils are equal, round, and reactive to light. Neck:      Musculoskeletal: Normal range of motion and neck supple. Cardiovascular:      Rate and Rhythm: Normal rate and regular rhythm. Heart sounds: No murmur.    Pulmonary:      Effort: Pulmonary effort is normal. Breath sounds: Normal breath sounds. No wheezing. Abdominal:      General: Bowel sounds are normal. There is no distension. Palpations: Abdomen is soft. Tenderness: There is no tenderness. Musculoskeletal: Normal range of motion. General: No tenderness. Skin:     General: Skin is warm and dry. Findings: No rash. Psychiatric:         Mood and Affect: Mood is anxious. Mood is not depressed. Behavior: Behavior normal. Behavior is not slowed or withdrawn. Judgment: Judgment is impulsive. Assessment:       Diagnosis Orders   1. ZAIDA (generalized anxiety disorder)     2. PTSD (post-traumatic stress disorder)     3. Shift work sleep disorder  zolpidem (AMBIEN) 10 MG tablet   4. Heart palpitations  fluticasone (FLONASE) 50 MCG/ACT nasal spray   5.  Seasonal allergic rhinitis, unspecified trigger  olopatadine (PATANOL) 0.1 % ophthalmic solution    metoprolol tartrate (LOPRESSOR) 50 MG tablet           Plan:      Chronic conditions stable  Labs reviewed  Refills as above   -increase Metoprolol 75 mg BID for symptomatic control  Follow up with Specialists   - Continue to push to see Psychiatrist  Continue Pristiq 50 mg and Ativan PRN   - minimize use of Ativan, concerns discussed with patient  Recommend Wellness Program through Work  RTO in         [address] 50 mg Daily - get better control of anxiety  Chronic conditions stable  RTO in 6 months  Call with update in 1 month              PADMINI Lange - CNP

## 2020-01-07 NOTE — TELEPHONE ENCOUNTER
Pt. Is calling and wanted to see if she could be prescribed a muscle relaxer to help with the back spasms she is having. She was taking zanaflex 4mg every 6 hours prn before for them.   Please advise pt at [phone]

## 2020-01-15 NOTE — TELEPHONE ENCOUNTER
Patient called and is asking if Satya Gray would make tizanidine 4 mg an ongoing medication for her back pain. 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 [facility] DOLV 1/7/2020, DONV 7/7/2020.

## 2020-02-02 NOTE — ED NOTES
Pelvis exmam completed with Dr. Valorie Garrido. Pt ambulated to the restroom to obtain urine sample.       Mirian Nava RN  02/02/20 8741

## 2020-02-02 NOTE — ED PROVIDER NOTES
[facility]  eMERGENCY dEPARTMENT eNCOUnter          CHIEF COMPLAINT       Chief Complaint   Patient presents with    Abdominal Pain       Nurses Notes reviewed and I agree except as noted in the HPI. HISTORY OF PRESENT ILLNESS    Sera Lowry is a 30 y.o. female who presents with abdominal pain    Location/Symptom: Lower abdominal pain  Timing/Onset: 2 days ago  Context/Setting: She is had antibiotics for respiratory infection 1 month ago  Remote history of C. difficile after cleocin prescription, years ago  No prior abdominal surgeries  Quality: She had lower abdominal pain discomfort that steady at times intensifies continuously for the last 2 days  Is gotten worse today she is felt a little faint and lightheaded with blood pressure 80/65 noted at home yesterday  Today she had diarrhea with multiple greenish stools and increased pain  She has nausea, vomited once  Not passed blood in the stool    Duration: 2 days  Modifying Factors: None  Severity: 7/10    REVIEW OF SYSTEMS     Review of Systems   Constitutional: Negative for chills and fever. HENT: Negative for congestion. Respiratory: Negative for shortness of breath. Cardiovascular: Negative for chest pain. Gastrointestinal: Positive for abdominal pain, diarrhea, nausea and vomiting. Negative for blood in stool. Genitourinary: Negative for difficulty urinating. Musculoskeletal: Negative for joint swelling. Skin: Negative for rash. Neurological: Positive for light-headedness. Hematological: Negative for adenopathy. Psychiatric/Behavioral: Negative for confusion. PAST MEDICAL HISTORY    has a past medical history of Allergy, Anxiety, Depression, Mesenteric adenitis, Ovarian cyst, Psychiatric problem, Seizure ([address] Seizures ([address] and Sinus tachycardia. SURGICAL HISTORY      has a past surgical history that includes Breast lumpectomy (2009); [place] tooth extraction (2009);  Mouth surgery (2009); Tonsillectomy; Adenoidectomy; LEEP; and Breast biopsy. CURRENT MEDICATIONS       Previous Medications    ACETAMINOPHEN (TYLENOL) 325 MG TABLET    Take 650 mg by mouth every 6 hours as needed for Pain    ALBUTEROL SULFATE  (90 BASE) MCG/ACT INHALER    Inhale 2 puffs into the lungs every 4 hours as needed for Wheezing or Shortness of Breath    FLUTICASONE (FLONASE) 50 MCG/ACT NASAL SPRAY    SHAKE LIQUID AND USE 2 SPRAYS BY NASAL ROUTE DAILY AS NEEDED FOR ALLERGIES    FOLIC ACID (FOLVITE) 1 MG TABLET    TAKE ONE TABLET BY MOUTH ONE TIME A DAY    METOPROLOL TARTRATE (LOPRESSOR) 50 MG TABLET    TAKE 1.5 TABLET BY MOUTH 2 TIMES A DAY    MONTELUKAST (SINGULAIR) 10 MG TABLET    TAKE ONE TABLET BY MOUTH EVERY EVENING    MULTIPLE VITAMIN (MULTI-VITAMINS) TABS    Take by mouth    OLOPATADINE (PATANOL) 0.1 % OPHTHALMIC SOLUTION    PLACE 1 DROP INTO EACH EYE TWO TIMES A DAY AS NEEDED FOR ALLERGIES    TIZANIDINE (ZANAFLEX) 4 MG TABLET    Take 1 tablet by mouth 3 times daily    ZOLPIDEM (AMBIEN) 10 MG TABLET    TAKE 1 TABLET BY MOUTH NIGHTLY AS NEEDED FOR SLEEP. ALLERGIES     is allergic to amoxicillin; avelox [moxifloxacin]; cleocin [clindamycin hcl]; levaquin [levofloxacin]; zonegran [zonisamide]; cyclobenzaprine; influenza vaccines; nsaids; tolmetin; tramadol; and vimpat [lacosamide]. FAMILY HISTORY     She indicated that her mother is alive. She indicated that her father is alive. She indicated that the status of her maternal grandmother is unknown. She indicated that the status of her maternal grandfather is unknown. She indicated that the status of her paternal grandmother is unknown.  She indicated that the status of her paternal grandfather is unknown.   family history includes Allergies in her father; Asthma in her father; Breast Cancer in her maternal grandmother; Diabetes in her paternal grandfather; Heart Disease in her maternal grandfather, maternal grandmother, and paternal grandfather; High at home will check hydration    DIAGNOSTIC RESULTS       RADIOLOGY: non-plain film images(s) such as CT, Ultrasound and MRI are read by the radiologist.  The patient had a multiple view CT scan of the abdomen and pelvis which demonstrates prominent small bowel loops with fluid consistent with diarrheal illness    [] Visualized and interpreted by me   [x] Radiologist's Wet Read Report Reviewed   [] Discussed with Radiologist.    LABS:   Labs Reviewed   BASIC METABOLIC PANEL - Abnormal; Notable for the following components:       Result Value    CO2 20 (*)     All other components within normal limits   LIPASE - Abnormal; Notable for the following components:    Lipase 158.9 (*)     All other components within normal limits   ANION GAP - Abnormal; Notable for the following components:    Anion Gap 20.0 (*)     All other components within normal limits   GLOMERULAR FILTRATION RATE, ESTIMATED - Abnormal; Notable for the following components:    Est, Glom Filt Rate 83 (*)     All other components within normal limits   GASTROINTESTINAL PANEL, MOLECULAR   C. TRACHOMATIS / N. GONORRHOEAE, DNA   C DIFF TOXIN/ANTIGEN   GENITAL CULTURE   KOH (SKIN,HAIR,NAILS)   WET PREP, GENITAL   CBC WITH AUTO DIFFERENTIAL   HEPATIC FUNCTION PANEL   HCG, SERUM, QUALITATIVE   C-REACTIVE PROTEIN   OSMOLALITY       EMERGENCY DEPARTMENT COURSE:   Vitals:    Vitals:    02/02/20 1440 02/02/20 1544 02/02/20 1644 02/02/20 1729   BP: (!) 150/105 (!) 144/89 110/67 109/73   Pulse: 76 78 71 80   Resp: 16 16 16 18   Temp: 97.3 °F (36.3 °C)      TempSrc: Oral      SpO2:  98% 99% 100%   Weight: 149 lb 11.2 oz (67.9 kg)        Nursing notes reviewed    Normal CBC renal function blood sugar    Lipase elevated to 158 of unclear significance    Stool for PCR was negative    Pregnancy test negative    CT scan showed evidence of enteritis    Treated symptomatically with 1 dose of morphine, Zofran Pepcid plus IV fluids    Feeling better discharge

## 2020-02-02 NOTE — ED TRIAGE NOTES
Pt to ED via EMS with CC of right lower quadrant abdominal pain rated 10/10 and described as sharp/waxing and waning. Pt states she has had dull discomfort in the area X3days but approximately one hour ago it suddenly became sharp and unbearable causing her to lose control of her bowels and vomit. Pt states the pain is the worst thing she has ever felt in her life and wants to stay on the bedpan to avoid being incontinent again. VS and assessment completed on arrival. Providers notified to see pt. Will continue to monitor.

## 2020-02-02 NOTE — ED NOTES
Pt resting in bed, VS reassessed and stable. IV site checked, and pain reassessed. Pt updated on POC and verbalizes understanding as well as no further needs, questions, or concerns at this time. Bed rails up X2 and call light within reach. Will continue to monitor.         Jonathan Hamilton RN  02/02/20 [id number]

## 2020-02-03 NOTE — LETTER
[facility] [address] [address] EMERGENCY DEPT  [address] [place] [id number]  Phone: [phone]               February 4, 2020    Patient: Karissa Mackay   YOB: 1990   Date of Visit: 2/3/2020       To Whom It May Concern:    Yobani Barajas was seen and treated in our emergency department on 2/3/2020. She may return to work on 2/4/2020.       Sincerely,       PAM        Signature:__________________________________

## 2020-02-04 NOTE — ED TRIAGE NOTES
Pt comes to the ED with mid abd pain and dizziness. Pt states it's been 4 days and was seen here yesterday. Pt states her pain is increasing by the hour. Pt states she might be feeling an aura come on and that she has epileptic siezures.

## 2020-02-04 NOTE — ED NOTES
Medications administered. VS obtained. Pt to [address] in stable condition.      Faye Lang RN  02/04/20 0030

## 2020-02-04 NOTE — ED PROVIDER NOTES
[facility]  eMERGENCY dEPARTMENT eNCOUnter          CHIEF COMPLAINT       Chief Complaint   Patient presents with    Abdominal Pain    Dizziness       Nurses Notes reviewed and I agreeexcept as noted in the HPI. HISTORY OF PRESENT ILLNESS    Marilou Hadley is a 30 y.o. female who presents to the Emergency Department for the evaluation of abdominal pain and dizziness. Patient reports onset of intermittent episodes of dizziness 5 days ago during which the patient states she feels as if she could lose consciousness, noting that she immediately sits down. Patient has a history of epileptic seizures and describes dizzy episodes as similar to symptoms that precede epileptic seizures, while the patient does not report loss of consciousness or epileptic seizure incidence. Patient also describes onset of dull suprapubic abdominal pain within the last 5 days and reports sudden worsening of pain yesterday. Patient states that pain worsened to a 10/10 yesterday and describes additional symptoms of nausea, emesis, and stool incontinence. Patient subsequently came to the department for evaluation and was diagnosed with gastroenteritis by Dr. Sadia Palencia and discharged in stable condition. Patient states that she decided to return to the department today for further evaluation due to persistence and worsening of abdominal pain and nausea, noting that she has not eaten anything due to decreased appetite. Patient denies fever, dysuria, recent sick contact, or any chance of pregnancy. Patient also denies drug and alcohol use. Patient does report that Dr. Evens Jones pelvic exam yesterday revealed that she is menstruating while patient believed menstrual cycle had concluded prior.  Patient has a medical history including anxiety, mesenteric adenitis, sinus tachycardia, and an ovarina cyst. A pelvic ultrasound last September revealed the patient to have uterine fibroids within her endometrium, while the patient No JVD. Trachea: No tracheal deviation. Cardiovascular:      Rate and Rhythm: Normal rate and regular rhythm. Heart sounds: Normal heart sounds. No murmur. No friction rub. No gallop. Pulmonary:      Effort: Pulmonary effort is normal.      Breath sounds: Normal breath sounds. No wheezing or rales. Abdominal:      General: Bowel sounds are normal.      Palpations: Abdomen is soft. There is no mass. Tenderness: There is abdominal tenderness in the suprapubic area. There is no guarding or rebound. Musculoskeletal: Normal range of motion. General: No tenderness. Lymphadenopathy:      Cervical: No cervical adenopathy. Skin:     General: Skin is warm and dry. Findings: No rash. Neurological:      Mental Status: She is alert and oriented to person, place, and time. Cranial Nerves: No cranial nerve deficit. Motor: No abnormal muscle tone. Coordination: Coordination normal.      Deep Tendon Reflexes: Reflexes are normal and symmetric. Reflexes normal.   Psychiatric:         Behavior: Behavior normal.         Thought Content: Thought content normal.         Judgment: Judgment normal.           DIFFERENTIAL DIAGNOSIS:   Differential diagnoses were discussed extensively with the patient and family including but no limited to gastroenteritis, uterine fibroids with postmenstrual cramping, pregnancy, urinary tract infection, mesenteric adenitis. DIAGNOSTIC RESULTS     EKG: All EKG's are interpreted by the Emergency Department Physician who either signs or Co-signs this chart in the absence of a cardiologist.  EKG interpreted by Lanette Balderas DO:    EKG read and interpreted by myself gives impression of normal sinus rhythm with sinus arrhythmia, with heart rate of 89; interval 136; QRS 82;QTc 467; axis 63, 56, 39.          RADIOLOGY: non-plain film images(s) such as CT, Ultrasound and MRI are read by the radiologist.    Uyen Wellington Contrast? None   Final Result   Possible colitis, correlate clinically. 2.4 x 1.7 cm pedunculated subserosal uterine fibroid. The prominent adnexal vessels noted on the earlier ultrasound are less apparent. **This report has been created using voice recognition software. It may contain minor errors which are inherent in voice recognition technology. **      Final report electronically signed by Dr. Mary Escalante on 2/4/2020 1:57 AM      US NON OB TRANSVAGINAL   Final Result      No evidence of ovarian torsion. Prominent adnexal vessels suggestive of pelvic congestion syndrome. This was noted on the prior CT. Persistent 1 x 0.6 x 0.4 cm possible fundal endometrial canal polyp or mass. Recommend correlation with sonohysterography. **This report has been created using voice recognition software. It may contain minor errors which are inherent in voice recognition technology. **       Final report electronically signed by Dr. Mary Escalante on 2/4/2020 1:23 AM      US DUP ABD PEL RETRO SCROT COMPLETE   Final Result      No evidence of ovarian torsion. Prominent adnexal vessels suggestive of pelvic congestion syndrome. This was noted on the prior CT. Persistent 1 x 0.6 x 0.4 cm possible fundal endometrial canal polyp or mass. Recommend correlation with sonohysterography. **This report has been created using voice recognition software. It may contain minor errors which are inherent in voice recognition technology. **       Final report electronically signed by Dr. Mary Escalante on 2/4/2020 1:23 AM          LABS:   Labs Reviewed   COMPREHENSIVE METABOLIC PANEL - Abnormal; Notable for the following components:       Result Value    Glucose 135 (*)     Potassium 3.1 (*)     All other components within normal limits   URINE WITH REFLEXED MICRO - Abnormal; Notable for the following components:    Specific Gravity, Urine > 1.030 (*)     Blood, Urine MODERATE (*)     All other components

## 2020-02-28 NOTE — TELEPHONE ENCOUNTER
Minnie Fletcher called requesting a refill on the following medications:  Requested Prescriptions     Pending Prescriptions Disp Refills    tiZANidine (ZANAFLEX) 4 MG tablet 30 tablet 1     Sig: Take 1 tablet by mouth 3 times daily     Pharmacy verified:  [email or web address]      Date of last visit:   Date of next visit (if applicable): 0/1/5929

## 2020-03-24 NOTE — ED PROVIDER NOTES
of her maternal grandmother is unknown. She indicated that the status of her maternal grandfather is unknown. She indicated that the status of her paternal grandmother is unknown. She indicated that the status of her paternal grandfather is unknown.   family history includes Allergies in her father; Asthma in her father; Breast Cancer in her maternal grandmother; Diabetes in her paternal grandfather; Heart Disease in her maternal grandfather, maternal grandmother, and paternal grandfather; High Cholesterol in her father; Other in her mother and paternal grandmother. SOCIAL HISTORY      reports that she has never smoked. She has never used smokeless tobacco. She reports current alcohol use. She reports that she does not use drugs. PHYSICAL EXAM     INITIAL VITALS:  height is 5' 2\" (1.575 m) and weight is 120 lb (54.4 kg). Her oral temperature is 98.3 °F (36.8 °C). Her blood pressure is 115/82 and her pulse is 108. Her respiration is 16 and oxygen saturation is 100%. Physical Exam  Vitals signs and nursing note reviewed. Constitutional:       Appearance: She is well-developed. She is not toxic-appearing or diaphoretic. HENT:      Head: Normocephalic and atraumatic. Eyes:      Extraocular Movements: Extraocular movements intact. Conjunctiva/sclera: Conjunctivae normal.      Pupils: Pupils are equal, round, and reactive to light. Neck:      Musculoskeletal: Normal range of motion and neck supple. Vascular: No JVD. Cardiovascular:      Rate and Rhythm: Normal rate and regular rhythm. Pulses: Normal pulses. Heart sounds: Normal heart sounds. No murmur. No friction rub. No gallop. Pulmonary:      Effort: Pulmonary effort is normal. No respiratory distress. Breath sounds: Normal breath sounds. No decreased breath sounds, wheezing, rhonchi or rales. Abdominal:      General: Bowel sounds are normal. There is no distension. Palpations: Abdomen is soft.       Tenderness: Notable for the following components:    Est, Glom Filt Rate 83 (*)     All other components within normal limits   HCG, SERUM, QUALITATIVE   MAGNESIUM   OSMOLALITY       EMERGENCY DEPARTMENT COURSE:   Vitals:    Vitals:    03/24/20 0751 03/24/20 0927 03/24/20 0931   BP: (!) 154/112 115/82    Pulse: 132 111 108   Resp: 16 16    Temp: 98.3 °F (36.8 °C)     TempSrc: Oral     SpO2: 100% 100%    Weight: 120 lb (54.4 kg)     Height: 5' 2\" (1.575 m)         8:16 AM EDT: The patient was seen and evaluated. MDM:  Patient presenting for evaluation after having had a seizure episode at work, patient has history epilepsy. 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 Patient last seizure was in 2018. She is back to baseline, not in postictal period. CAT scan of the head shows contusion to posterior head. Patient will be discharged home, advised to call neurologist for follow-up/possible medication reevaluation. CRITICAL CARE:   None     CONSULTS:      PROCEDURES:  None     FINAL IMPRESSION      1. Breakthrough seizure [facility])          DISPOSITION/PLAN       PATIENT REFERRED TO:  Annabelle Palafox  [facility] [id number] [phone]    Call today  RE-CHECK AND FURTHER TESTING AS NEEDED      DISCHARGE MEDICATIONS:  Discharge Medication List as of 3/24/2020  9:39 AM          (Please note that portions of this note were completed with a voice recognition program.  Efforts were made to edit the dictations but occasionally words are mis-transcribed.)    Scribe:  Teodora Corbett 3/24/20 8:16 AM EDT Scribing for and in the presence of Arturo Wesley DO. Signed by: Priscila Bernal, 03/24/20 1:40 PM    Provider:  I personally performed the services described in the documentation, reviewed and edited the documentation which was dictated to the scribe in my presence, and it accurately records my words and actions.     Arturo Wesley DO 3/24/20 1:40 PM King Bethany DO  03/24/20 1432

## 2020-03-24 NOTE — ED TRIAGE NOTES
Patient arrived to room 23 with c/o seizure. Patient stated she was getting off work on [address] and had a seizure and fell back and hit the back of her head. Patient stated she normally can tell when she is going to have one but was unable to this time. Alicja MERCADO Stated 2 nurses on the floor witnessed the patient, stated her body went stiff then fell straight back.

## 2020-03-25 NOTE — CARE COORDINATION
ACM attempted to reach patient for follow up call regarding ED visit. Voicemail was full, unable to leave message. Will continue to follow.